# Patient Record
Sex: MALE | Race: WHITE | Employment: FULL TIME | URBAN - METROPOLITAN AREA
[De-identification: names, ages, dates, MRNs, and addresses within clinical notes are randomized per-mention and may not be internally consistent; named-entity substitution may affect disease eponyms.]

---

## 2020-06-25 ENCOUNTER — TELEPHONE (OUTPATIENT)
Dept: PRIMARY CARE CLINIC | Age: 30
End: 2020-06-25

## 2021-01-21 ENCOUNTER — OFFICE VISIT (OUTPATIENT)
Dept: ORTHOPEDIC SURGERY | Age: 31
End: 2021-01-21
Payer: COMMERCIAL

## 2021-01-21 VITALS — HEIGHT: 67 IN | WEIGHT: 135 LBS | BODY MASS INDEX: 21.19 KG/M2

## 2021-01-21 DIAGNOSIS — L30.9 DERMATITIS: ICD-10-CM

## 2021-01-21 DIAGNOSIS — M79.671 RIGHT FOOT PAIN: Primary | ICD-10-CM

## 2021-01-21 DIAGNOSIS — M79.672 LEFT FOOT PAIN: ICD-10-CM

## 2021-01-21 DIAGNOSIS — Z76.89 ENCOUNTER TO ESTABLISH CARE WITH NEW DOCTOR: ICD-10-CM

## 2021-01-21 DIAGNOSIS — B35.3 TINEA PEDIS OF BOTH FEET: ICD-10-CM

## 2021-01-21 PROCEDURE — 99203 OFFICE O/P NEW LOW 30 MIN: CPT | Performed by: ORTHOPAEDIC SURGERY

## 2021-01-21 NOTE — PROGRESS NOTES
[facility]  Luke Pro MD    Patient Name: Iraj Pace MRN: <[id number]>   Age: 31 y.o. YOB: 1990   Sex: male      [address] Complaint   Patient presents with    New Patient     Bilateral Foot - phalanxes w/abrasions that result in discoloration and itiching. Started 3wks ago        [facility]   Iraj Pace is a 31 y.o. male presents the office today for evaluation of a problem with his toes. About 3 weeks ago he noticed some discoloration in the toes of his left greater than right foot. He had had no injury and this is not a painful condition. He is noted some purplish discoloration and some small blisterlike formations and occasionally some itching. He has had no previous evaluation or treatment for this problem. He did some research on the Internet and became concerned enough to come in for orthopedic consultation today. He states he has recently moved to the [facility] and does not have an established primary care physician I was not sure where to go for help 1st.    PAST MEDICAL HISTORY    History reviewed. No pertinent past medical history. PAST SURGICAL HISTORY   History reviewed. No pertinent surgical history. MEDICATIONS     No current outpatient medications on file. No current facility-administered medications for this visit. ALLERGIES   Not on File    FAMILY HISTORY   History reviewed. No pertinent family history.     SOCIAL HISTORY     Social History     Socioeconomic History    Marital status: Single     Spouse name: None    Number of children: None    Years of education: None    Highest education level: None   Occupational History    None   Social Needs    Financial resource strain: None    Food insecurity     Worry: None     Inability: None    Transportation needs     Medical: None     Non-medical: None   Tobacco Use    Smoking status: None   Substance and Sexual Activity    Alcohol use: None    Drug use: None    Sexual activity: None   Lifestyle    Physical activity     Days per week: None     Minutes per session: None    Stress: None   Relationships    Social connections     Talks on phone: None     Gets together: None     Attends Alevism service: None     Active member of club or organization: None     Attends meetings of clubs or organizations: None     Relationship status: None    Intimate partner violence     Fear of current or ex partner: None     Emotionally abused: None     Physically abused: None     Forced sexual activity: None   Other Topics Concern    None   Social History Narrative    None       REVIEW OF SYSTEMS   General: no fever, chills, night sweats, anorexia, malaise, fatigue, or weight change  Hematologic:  no unexplained bleeding or bruising  HEENT:   no nasal congestion, rhinorrhea, sore throat, or facial pain  Respiratory:  no cough, dyspnea, or chest pain  Cardiovascular:  no angina, US, PND, orthopnea, dependent edema, or palpitations  Gastrointestinal:  no nausea, vomiting, diarrhea, constipation, or abdominal pain  Genitourinary:  no urinary urgency, frequency, dysuria, or hematuria  Musculoskeletal: see HPI  Endocrine:  no heat or cold intolerance and no polyphagia, polydipsia, or polyuria  Skin:  no skin eruptions or changing lesions  Neurologic:  no focal weakness, numbness/tingling, tremor, or severe headache. See HPI. See HPI for pertinent positives. PHYSICAL EXAM   Vital Signs:   Vitals:    01/21/21 1326   Weight: 135 lb (61.2 kg)   Height: 5' 7\" (1.702 m)       General appearance: healthy, alert, no distress  Skin: Skin color, texture, turgor normal. No rashes or lesions  HEENT: atraumatic, normocephalic.  PERRL  Respiratory: Unlabored breathing  Lymphatic: No adenopathy   Neuro: Alert and oriented, normal distal sensation, normal bilateral DTRs  Vascular: Normal distal capillary and distal pulses  Muskuloskeletal Exam: Bilateral foot examination shows some purplish discoloration in the left foot 2nd 3rd and 4th toes and in the right foot 2nd toe without erythema or warmth. There is no ecchymosis. There is no tenderness. There is a small area of dried skin on the dorsal aspects of the toes. No purpura or petechial hemorrhages. No nail abnormalities. No other callosities or skin lesions. Normal alignment of the lesser toes. Normal gait. RADIOLOGY   no radiographs taken today    IMPRESSION     1. Right foot pain    2. Left foot pain    3. Dermatitis    4. Tinea pedis of both feet    5. Encounter to establish care with new doctor         PLAN   I had a lengthy discussion with patient today regarding diagnosis and treatment options and recommendations. I suspect some sort of dermatitis or skin infection such as tinea pedis. I am going to suggest some topical antifungal medication that he can obtain over-the-counter. We discussed proper skin care for the feet I recommended wicking socks and frequent changes in shoe wear. I am going to refer him to a dermatologist but more importantly I think it would be best for him to establish care with a primary care physician as a first-line resource for medical issues that arise as well as for well patient care. FOLLOWUP     Return if symptoms worsen or fail to improve.     Orders Placed This Encounter   Procedures   [facility]     Referral Priority:   Routine     Referral Type:   Eval and Treat     Referral Reason:   Specialty Services Required     Requested Specialty:   Dermatology     Number of Visits Requested:   1   Cely Cantu MD, Family Medicine, [facility]     Referral Priority:   Routine     Referral Type:   Eval and Treat     Referral Reason:   Specialty Services Required     Referred to Provider:   Hossein Villar MD     Requested Specialty:   Family Medicine     Number of Visits Requested:   1      No orders of the defined types were placed in this encounter.       Patient was instructed on appropriate use of braces, participation in home exercise programs, healthy lifestyle choices and weight loss as appropriate     Primo Rosado MD

## 2021-08-24 ENCOUNTER — APPOINTMENT (RX ONLY)
Dept: URBAN - METROPOLITAN AREA CLINIC 170 | Facility: CLINIC | Age: 31
Setting detail: DERMATOLOGY
End: 2021-08-24

## 2021-08-24 DIAGNOSIS — L23.9 ALLERGIC CONTACT DERMATITIS, UNSPECIFIED CAUSE: ICD-10-CM | Status: INADEQUATELY CONTROLLED

## 2021-08-24 DIAGNOSIS — L259 CONTACT DERMATITIS AND OTHER ECZEMA, UNSPECIFIED CAUSE: ICD-10-CM

## 2021-08-24 PROBLEM — L30.9 DERMATITIS, UNSPECIFIED: Status: ACTIVE | Noted: 2021-08-24

## 2021-08-24 PROCEDURE — 99204 OFFICE O/P NEW MOD 45 MIN: CPT | Mod: 25

## 2021-08-24 PROCEDURE — ? COUNSELING

## 2021-08-24 PROCEDURE — ? PRESCRIPTION

## 2021-08-24 PROCEDURE — 11104 PUNCH BX SKIN SINGLE LESION: CPT

## 2021-08-24 PROCEDURE — ? BIOPSY BY PUNCH METHOD

## 2021-08-24 PROCEDURE — ? ADDITIONAL NOTES

## 2021-08-24 RX ORDER — TRIAMCINOLONE ACETONIDE 1 MG/G
CREAM TOPICAL
Qty: 1 | Refills: 2 | Status: CANCELLED
Stop reason: SDUPTHER

## 2021-08-24 RX ORDER — TRIAMCINOLONE ACETONIDE 1 MG/G
CREAM TOPICAL
Qty: 1 | Refills: 5 | Status: ERX | COMMUNITY
Start: 2021-08-24

## 2021-08-24 RX ORDER — PREDNISONE 20 MG/1
TABLET ORAL
Qty: 30 | Refills: 0 | Status: ERX | COMMUNITY
Start: 2021-08-24

## 2021-08-24 RX ORDER — PREDNISONE 20 MG/1
TABLET ORAL
Qty: 30 | Refills: 0 | Status: CANCELLED
Stop reason: SDUPTHER

## 2021-08-24 RX ADMIN — PREDNISONE: 20 TABLET ORAL at 00:00

## 2021-08-24 RX ADMIN — TRIAMCINOLONE ACETONIDE: 1 CREAM TOPICAL at 00:00

## 2021-08-24 ASSESSMENT — LOCATION ZONE DERM: LOCATION ZONE: LEG

## 2021-08-24 ASSESSMENT — LOCATION SIMPLE DESCRIPTION DERM: LOCATION SIMPLE: LEFT THIGH

## 2021-08-24 ASSESSMENT — LOCATION DETAILED DESCRIPTION DERM: LOCATION DETAILED: LEFT ANTERIOR LATERAL PROXIMAL THIGH

## 2021-08-24 NOTE — PROCEDURE: BIOPSY BY PUNCH METHOD
Detail Level: Detailed
Was A Bandage Applied: Yes
Punch Size In Mm: 4
Biopsy Type: H and E
Anesthesia Type: 2% lidocaine with epinephrine
Anesthesia Volume In Cc (Will Not Render If 0): 1
Additional Anesthesia Volume In Cc (Will Not Render If 0): 0
Hemostasis: Aluminum Chloride and Electrocautery
Epidermal Sutures: 4-0 Ethilon
Wound Care: Bacitracin
Dressing: bandage
Suture Removal: 14 days
Patient Will Remove Sutures At Home?: No
Lab: -102
Lab Facility: 3
Consent: Written consent was obtained and risks were reviewed including but not limited to scarring, infection, bleeding, scabbing, incomplete removal, nerve damage and allergy to anesthesia.
Post-Care Instructions: I reviewed with the patient in detail post-care instructions. Patient is to keep the biopsy site dry overnight, and then apply bacitracin twice daily until healed. Patient may apply hydrogen peroxide soaks to remove any crusting.
Home Suture Removal Text: Patient was provided a home suture removal kit and will remove their sutures at home.  If they have any questions or difficulties they will call the office.
Notification Instructions: Patient will be notified of biopsy results. However, patient instructed to call the office if not contacted within 2 weeks.
Billing Type: Third-Party Bill
Information: Selecting Yes will display possible errors in your note based on the variables you have selected. This validation is only offered as a suggestion for you. PLEASE NOTE THAT THE VALIDATION TEXT WILL BE REMOVED WHEN YOU FINALIZE YOUR NOTE. IF YOU WANT TO FAX A PRELIMINARY NOTE YOU WILL NEED TO TOGGLE THIS TO 'NO' IF YOU DO NOT WANT IT IN YOUR FAXED NOTE.

## 2021-08-24 NOTE — HPI: RASH
What Type Of Note Output Would You Prefer (Optional)?: Bullet Format
How Severe Is Your Rash?: moderate
Is This A New Presentation, Or A Follow-Up?: Rash
Additional History: Rash started around August 11, went to [facility] 2 days later, first treatment Benadryl prescribed, by Sunday face swelling and rash all over, went back to clinic, was given a shot and prednisone pack, finished pack on Saturday.

## 2021-09-07 ENCOUNTER — APPOINTMENT (RX ONLY)
Dept: URBAN - METROPOLITAN AREA CLINIC 170 | Facility: CLINIC | Age: 31
Setting detail: DERMATOLOGY
End: 2021-09-07

## 2021-09-07 DIAGNOSIS — Z48.02 ENCOUNTER FOR REMOVAL OF SUTURES: ICD-10-CM

## 2021-09-07 PROCEDURE — ? SUTURE REMOVAL (GLOBAL PERIOD)

## 2021-09-07 ASSESSMENT — LOCATION SIMPLE DESCRIPTION DERM: LOCATION SIMPLE: LEFT THIGH

## 2021-09-07 ASSESSMENT — LOCATION ZONE DERM: LOCATION ZONE: LEG

## 2021-09-07 ASSESSMENT — LOCATION DETAILED DESCRIPTION DERM: LOCATION DETAILED: LEFT ANTERIOR LATERAL PROXIMAL THIGH

## 2021-09-07 NOTE — PROCEDURE: SUTURE REMOVAL (GLOBAL PERIOD)
Detail Level: Detailed
Add 79762 Cpt? (Important Note: In 2017 The Use Of 37811 Is Being Tracked By Cms To Determine Future Global Period Reimbursement For Global Periods): no

## 2022-06-06 ENCOUNTER — TELEPHONE (OUTPATIENT)
Dept: FAMILY MEDICINE CLINIC | Age: 32
End: 2022-06-06

## 2022-06-06 NOTE — TELEPHONE ENCOUNTER
----- Message from Yandy Braga sent at 6/6/2022 10:06 AM EDT -----  Subject: Message to Provider    QUESTIONS  Information for Provider? pt has est care appt on 6/28/2022  Would like   to called if anything sooner opens up   ---------------------------------------------------------------------------  --------------  [address]  What is the best way for the office to contact you? OK to leave message on   voicemail  Preferred Call Back Phone Number? [phone]  ---------------------------------------------------------------------------  --------------  SCRIPT ANSWERS  Relationship to Patient?  Self

## 2022-06-06 NOTE — TELEPHONE ENCOUNTER
----- Message from Kayley Anne sent at 6/6/2022 10:06 AM EDT -----  Subject: Message to Provider    QUESTIONS  Information for Provider? pt has est care appt on 6/28/2022  Would like   to called if anything sooner opens up   ---------------------------------------------------------------------------  --------------  [address]  What is the best way for the office to contact you? OK to leave message on   voicemail  Preferred Call Back Phone Number? [phone]  ---------------------------------------------------------------------------  --------------  SCRIPT ANSWERS  Relationship to Patient?  Self

## 2022-06-28 ENCOUNTER — OFFICE VISIT (OUTPATIENT)
Dept: FAMILY MEDICINE CLINIC | Age: 32
End: 2022-06-28
Payer: COMMERCIAL

## 2022-06-28 VITALS
HEART RATE: 73 BPM | OXYGEN SATURATION: 99 % | DIASTOLIC BLOOD PRESSURE: 80 MMHG | HEIGHT: 67 IN | WEIGHT: 134 LBS | SYSTOLIC BLOOD PRESSURE: 118 MMHG | TEMPERATURE: 97.3 F | BODY MASS INDEX: 21.03 KG/M2

## 2022-06-28 DIAGNOSIS — M50.30 DDD (DEGENERATIVE DISC DISEASE), CERVICAL: ICD-10-CM

## 2022-06-28 DIAGNOSIS — F41.9 ANXIETY: ICD-10-CM

## 2022-06-28 DIAGNOSIS — Z76.89 ENCOUNTER TO ESTABLISH CARE WITH NEW DOCTOR: ICD-10-CM

## 2022-06-28 DIAGNOSIS — F41.9 ANXIETY: Primary | ICD-10-CM

## 2022-06-28 PROBLEM — E80.4 GILBERT'S SYNDROME: Status: ACTIVE | Noted: 2019-05-06

## 2022-06-28 LAB
A/G RATIO: 2 (ref 1.1–2.2)
ALBUMIN SERPL-MCNC: 5 G/DL (ref 3.4–5)
ALP BLD-CCNC: 43 U/L (ref 40–129)
ALT SERPL-CCNC: 14 U/L (ref 10–40)
ANION GAP SERPL CALCULATED.3IONS-SCNC: 14 MMOL/L (ref 3–16)
AST SERPL-CCNC: 23 U/L (ref 15–37)
BASOPHILS ABSOLUTE: 0 K/UL (ref 0–0.2)
BASOPHILS RELATIVE PERCENT: 0.3 %
BILIRUB SERPL-MCNC: 0.9 MG/DL (ref 0–1)
BUN BLDV-MCNC: 11 MG/DL (ref 7–20)
CALCIUM SERPL-MCNC: 10.3 MG/DL (ref 8.3–10.6)
CHLORIDE BLD-SCNC: 100 MMOL/L (ref 99–110)
CO2: 25 MMOL/L (ref 21–32)
CREAT SERPL-MCNC: 0.9 MG/DL (ref 0.9–1.3)
EOSINOPHILS ABSOLUTE: 0 K/UL (ref 0–0.6)
EOSINOPHILS RELATIVE PERCENT: 0.7 %
GFR AFRICAN AMERICAN: >60
GFR NON-AFRICAN AMERICAN: >60
GLUCOSE BLD-MCNC: 85 MG/DL (ref 70–99)
HCT VFR BLD CALC: 46.3 % (ref 40.5–52.5)
HEMOGLOBIN: 15.9 G/DL (ref 13.5–17.5)
LYMPHOCYTES ABSOLUTE: 1.8 K/UL (ref 1–5.1)
LYMPHOCYTES RELATIVE PERCENT: 37.8 %
MCH RBC QN AUTO: 33 PG (ref 26–34)
MCHC RBC AUTO-ENTMCNC: 34.2 G/DL (ref 31–36)
MCV RBC AUTO: 96.3 FL (ref 80–100)
MONOCYTES ABSOLUTE: 0.5 K/UL (ref 0–1.3)
MONOCYTES RELATIVE PERCENT: 11 %
NEUTROPHILS ABSOLUTE: 2.4 K/UL (ref 1.7–7.7)
NEUTROPHILS RELATIVE PERCENT: 50.2 %
PDW BLD-RTO: 12.9 % (ref 12.4–15.4)
PLATELET # BLD: 227 K/UL (ref 135–450)
PMV BLD AUTO: 8.7 FL (ref 5–10.5)
POTASSIUM SERPL-SCNC: 4.3 MMOL/L (ref 3.5–5.1)
RBC # BLD: 4.81 M/UL (ref 4.2–5.9)
SODIUM BLD-SCNC: 139 MMOL/L (ref 136–145)
TOTAL PROTEIN: 7.5 G/DL (ref 6.4–8.2)
TSH REFLEX FT4: 2.52 UIU/ML (ref 0.27–4.2)
WBC # BLD: 4.8 K/UL (ref 4–11)

## 2022-06-28 PROCEDURE — 99203 OFFICE O/P NEW LOW 30 MIN: CPT | Performed by: NURSE PRACTITIONER

## 2022-06-28 RX ORDER — BUSPIRONE HYDROCHLORIDE 5 MG/1
5 TABLET ORAL 3 TIMES DAILY
Qty: 90 TABLET | Refills: 0 | Status: SHIPPED | OUTPATIENT
Start: 2022-06-28 | End: 2022-07-28

## 2022-06-28 ASSESSMENT — PATIENT HEALTH QUESTIONNAIRE - PHQ9
1. LITTLE INTEREST OR PLEASURE IN DOING THINGS: 1
SUM OF ALL RESPONSES TO PHQ QUESTIONS 1-9: 2
SUM OF ALL RESPONSES TO PHQ QUESTIONS 1-9: 2
SUM OF ALL RESPONSES TO PHQ9 QUESTIONS 1 & 2: 2
2. FEELING DOWN, DEPRESSED OR HOPELESS: 1
SUM OF ALL RESPONSES TO PHQ QUESTIONS 1-9: 2
SUM OF ALL RESPONSES TO PHQ QUESTIONS 1-9: 2

## 2022-06-28 NOTE — PROGRESS NOTES
2022    Jayna Pollock (:  1990) is a 36 y.o. male, here to establish care or for evaluation of the following medical concerns:    Resents to establish care:  C/o anxiety- constant overthinking. All started in November but can't recall a trigger. Thoughts of dying are constant. Has developed techniques to check heart beat and to reassure himself he is OK  Fear of breathing problems and vomiting  Worried about heart failure  Is usually able to talk self out of it but is interfering with his daily activities now- can't sit in movies or plays, thoughts overtake his ability to function  Started drinking wine to calm self. 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 No SI/HI  Not , no partner now. Family in the area. Supportive of only what he has shared  Not currently SA- when he was female partners only  Loves to play pickleball for exercise but having problems convincing himself to play. Is amenable to therapy and meds      Review of Systems   Musculoskeletal: Positive for neck pain. Occ left scapular and shoulder pain, radiating to left arm. No current pain. +DDD C4-5   All other systems reviewed and are negative. Current Outpatient Medications   Medication Sig Dispense Refill    busPIRone (BUSPAR) 5 MG tablet Take 1 tablet by mouth 3 times daily 90 tablet 0     No current facility-administered medications for this visit. No Known Allergies    No past medical history on file. No past surgical history on file.     Social History     Socioeconomic History    Marital status: Single     Spouse name: Not on file    Number of children: Not on file    Years of education: Not on file    Highest education level: Not on file   Occupational History    Not on file   Tobacco Use    Smoking status: Current Every Day Smoker     Types: Cigars    Smokeless tobacco: Never Used   Substance and Sexual Activity    Alcohol use: Not Currently    Drug use: Never    Sexual activity: Not on file   Other Topics Concern    Not on file   Social History Narrative    Not on file     Social Determinants of Health     Financial Resource Strain:     Difficulty of Paying Living Expenses: Not on file   Food Insecurity:     Worried About Running Out of Food in the Last Year: Not on file    Claudia of Food in the Last Year: Not on file   Transportation Needs:     Lack of Transportation (Medical): Not on file    Lack of Transportation (Non-Medical): Not on file   Physical Activity:     Days of Exercise per Week: Not on file    Minutes of Exercise per Session: Not on file   Stress:     Feeling of Stress : Not on file   Social Connections:     Frequency of Communication with Friends and Family: Not on file    Frequency of Social Gatherings with Friends and Family: Not on file    Attends Zoroastrianism Services: Not on file    Active Member of [address] [place] [address] or Organizations: Not on file    Attends Club or Organization Meetings: Not on file    Marital Status: Not on file   Intimate Partner Violence:     Fear of Current or Ex-Partner: Not on file    Emotionally Abused: Not on file    Physically Abused: Not on file    Sexually Abused: Not on file   Housing Stability:     Unable to Pay for Housing in the Last Year: Not on file    Number of [place] in the Last Year: Not on file    Unstable Housing in the Last Year: Not on file        No family history on file. Vitals:    06/28/22 0956   BP: 118/80   Pulse: 73   Temp: 97.3 °F (36.3 °C)   SpO2: 99%       Estimated body mass index is 20.99 kg/m² as calculated from the following:    Height as of this encounter: 5' 7\" (1.702 m). Weight as of this encounter: 134 lb (60.8 kg). Physical Exam  Constitutional:       General: He is not in acute distress. Appearance: He is well-developed. He is not diaphoretic.    HENT: Head: Normocephalic and atraumatic. Right Ear: External ear normal.      Left Ear: External ear normal.      Nose: Nose normal.      Mouth/Throat:      Pharynx: No oropharyngeal exudate. Eyes:      General:         Right eye: No discharge. Left eye: No discharge. Conjunctiva/sclera: Conjunctivae normal.      Pupils: Pupils are equal, round, and reactive to light. Neck:      Thyroid: No thyromegaly. Trachea: No tracheal deviation. Cardiovascular:      Rate and Rhythm: Normal rate and regular rhythm. Heart sounds: Normal heart sounds. No murmur heard. No friction rub. No gallop. Pulmonary:      Effort: Pulmonary effort is normal. No respiratory distress. Breath sounds: Normal breath sounds. No stridor. No wheezing or rales. Chest:      Chest wall: No tenderness. Abdominal:      General: Bowel sounds are normal. There is no distension. Palpations: Abdomen is soft. There is no mass. Tenderness: There is no abdominal tenderness. There is no guarding or rebound. Hernia: No hernia is present. Musculoskeletal:         General: No tenderness or deformity. Normal range of motion. Cervical back: Normal range of motion and neck supple. Lymphadenopathy:      Cervical: No cervical adenopathy. Skin:     General: Skin is warm and dry. Capillary Refill: Capillary refill takes less than 2 seconds. Coloration: Skin is not pale. Findings: No erythema or rash. Neurological:      Mental Status: He is alert and oriented to person, place, and time. Cranial Nerves: No cranial nerve deficit. Sensory: No sensory deficit. Motor: No abnormal muscle tone. Coordination: Coordination normal.   Psychiatric:         Behavior: Behavior normal.         Thought Content:  Thought content normal.         Judgment: Judgment normal.         ASSESSMENT/PLAN:  Health Maintenance   Topic Date Due    Pneumococcal 0-64 years Vaccine (1 - PCV) Never done    Depression Screen  06/28/2023    DTaP/Tdap/Td vaccine (3 - Td or Tdap) 05/16/2026    Hepatitis A vaccine  Completed    Meningococcal (ACWY) vaccine  Completed    Flu vaccine  Completed    COVID-19 Vaccine  Completed    Hepatitis B vaccine  Aged Out    Hib vaccine  Aged Out    Varicella vaccine  Discontinued    Hepatitis C screen  Discontinued    HIV screen  Discontinued        Diagnosis Orders   1. Anxiety  busPIRone (BUSPAR) 5 MG tablet    CBC with Auto Differential    Comprehensive Metabolic Panel    TSH with Reflex to FT4   2. Encounter to establish care with new doctor     3. DDD (degenerative disc disease), cervical     Anxiety  TANI 21  Not controlled  Buspar TID  Start in evening to check effects  Follow up with Dr. Abena Baldwin for eval and poss CBT  See me in 2 weeks    Encounter to establish care with new doctor   HM UTD    DDD (degenerative disc disease), cervical  Stable, well controlled          Return in about 2 weeks (around 7/12/2022). An  electronic signature was used to authenticate this note.   --Elisabeth Beltran, JOSE ANTONIO - CNP on 6/29/2022 at 11:07 AM

## 2022-06-28 NOTE — ASSESSMENT & PLAN NOTE
TANI 21  Not controlled  Buspar TID  Start in evening to check effects  Follow up with Dr. Berton Goltz for eval and poss CBT  See me in 2 weeks

## 2022-06-29 ASSESSMENT — ANXIETY QUESTIONNAIRES
7. FEELING AFRAID AS IF SOMETHING AWFUL MIGHT HAPPEN: 3
1. FEELING NERVOUS, ANXIOUS, OR ON EDGE: 3
GAD7 TOTAL SCORE: 21
2. NOT BEING ABLE TO STOP OR CONTROL WORRYING: 3
3. WORRYING TOO MUCH ABOUT DIFFERENT THINGS: 3
IF YOU CHECKED OFF ANY PROBLEMS ON THIS QUESTIONNAIRE, HOW DIFFICULT HAVE THESE PROBLEMS MADE IT FOR YOU TO DO YOUR WORK, TAKE CARE OF THINGS AT HOME, OR GET ALONG WITH OTHER PEOPLE: EXTREMELY DIFFICULT
4. TROUBLE RELAXING: 3
5. BEING SO RESTLESS THAT IT IS HARD TO SIT STILL: 3
6. BECOMING EASILY ANNOYED OR IRRITABLE: 3

## 2022-07-25 ENCOUNTER — TELEMEDICINE (OUTPATIENT)
Dept: PSYCHOLOGY | Age: 32
End: 2022-07-25
Payer: COMMERCIAL

## 2022-07-25 DIAGNOSIS — F41.1 GENERALIZED ANXIETY DISORDER: Primary | ICD-10-CM

## 2022-07-25 PROCEDURE — 90791 PSYCH DIAGNOSTIC EVALUATION: CPT | Performed by: PSYCHOLOGIST

## 2022-07-25 NOTE — Clinical Note
Thanks for the referral. Going to need some more time with him. He's currently quite closed off to his internal experience. Will work on building more self-awareness and skill building. He hasn't tried the BuSpar yet because when he asked the pharmacist about drinking alcohol on it, the pharmacist told  him it was a very low dose and it was fine, which pt took to mean it wouldn't do much for him anyway. But when I mentioned that it could still help, he shifted back to saying he was glad he has it and is open to trying it, he just hasn't yet. Probably best to hold off on introducing the idea of an SSRI for now until we see how much progress we can make. Thanks!

## 2022-07-25 NOTE — PROGRESS NOTES
Behavioral Health Consultation  Renate Orourke Psy.D. Psychologist  7/25/2022  9-9:40 AM EDT      Time spent with Patient: 40 minutes  This is patient's first [facility] appointment. Reason for Consult: Anxiety  Referring Provider: JOSE ANTONIO Soares - CNP  [address] [address]    TELEHEALTH VISIT -- Audio/Visual (During [facility] public health emergency)    Pt provided informed consent for the behavioral health program and participation in telehealth services. Discussed with patient the model of service, including the limits of confidentiality (e.g., abuse reporting, suicide intervention) and the nature of the [facility] approach (e.g., focused, targeted interventions; open communication with PCP). Pt indicated understanding. Feedback given to PCP. Edgard Oquendo was evaluated through a synchronous (real-time) audio-video encounter using HIPAA-compliant technology. The patient (or guardian, if applicable) is aware that this is a billable service, which includes applicable co-pays. This Virtual Visit was conducted with patient's (and/or legal guardian's) consent. The visit was conducted pursuant to the emergency declaration under the [address] [place] [address] authority and the [employer] General Act. Patient identification was verified, and a caregiver was present when appropriate. The patient was located in a state where the provider was licensed to provide care. Conducted a risk-benefit analysis and determined that the patient's presenting problems are consistent with the use of telepsychology. Determined that the patient has sufficient knowledge and skills in the use of technology enabling them to adequately benefit from telepsychology. It was determined that this patient was able to be properly treated without an in-person session. Patient verified current location at the beginning of the visit.     Verified the following information:  Patient's identification: Yes  Patient location: [place] [id number]  Patient's call back number: [phone]  Patient's emergency contact's name and number, as well as permission to contact them if needed:  Extended Emergency Contact Information  Primary Emergency Contact: [address] Phone: [phone]  Relation: Parent  Secondary Emergency Contact: NATACHA/ Denisa [address], [address] Phone: [phone]  Relation: Parent    Provider location: Jon Mccall:    Love-Work-Play     -Living Situation - Lives alone. Bought a house in March 2020 in [place].     -Family / [place] - Family lives in [place]. Good friend group that he spends a lot of time with.    -Work / School - [employer]. Travels often. Every week is different. 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 -Anjum Juárezel / [employer] Live / Stress Management - Likes to play sports. Hangs out with friends - concerts, plays, sporting events. Walks his dog. Foodie. Not feeling stressed. Goes until he hits a wall, then he stops. -Confucianism / Spiritual Life - Goes to [employer]. Health Behaviors     -Alcohol / Drugs - Averages 2 glasses, 3-4 times per week. Cut back on alcohol but still drinks with work. Tripled water intake. -Caffeine - 1 cup of coffee daily. Cut back on pop; now have 1 pop per week. -Sleep - Averaging 6-8 hours/night. Able to sleep through the night. Able to fall sleep. Not consistent with sleep schedule. -Exercise - Plays sports. Had taken time off from physical activity d/t a shoulder issue that led to concern about heart issues.     -Risk Assessment - No SI plan or intent. -Mental Health - Hard to slow his mind down. Has \"ludicrous\" thoughts that he knows aren't real. Fear is primarily focused on the health of his heart. Recalls leaving seminars in college for no reason.  Has to sit on the isle at [employer] events in case he vomits, which has never happened. Usually feels better with time, which shows him that the feared events do not happen. No past psychotherapy. PCP prescribed BuSpar. Pt chose not to take it yet, although he reported benefiting from knowing he has that option. Social History     Tobacco Use    Smoking status: Every Day     Types: Cigars    Smokeless tobacco: Never   Substance Use Topics    Alcohol use: Not Currently      Illicit drugs:   Social History     Substance and Sexual Activity   Drug Use Never        O:  Interventions:  -Contextual assessment  -Supportive techniques  -Provided psychoeducation re: anxiety and tuning in to his internal experience rather than tuning out and avoiding / distracting  -Practiced diaphragmatic breathing  -Recommended journaling  -Conducted risk assessment. Appropriate for outpatient / telehealth care at this time. A:  MSE:  Appearance: good hygiene  and appropriate attire  Attitude: cooperative and friendly  Consciousness: alert  Orientation: oriented to person, place, time, general circumstance  Memory: recent and remote memory intact  Attention/Concentration: intact during session  Psychomotor Activity: normal  Eye Contact: normal  Speech: normal rate and volume, well-articulated  Mood: anxious  Affect: congruent  Perception: within normal limits  Thought Content: within normal limits  Thought Process: logical, coherent and goal-directed  Insight: good  Judgment: intact  Ability to understand instructions: Yes  Ability to respond meaningfully: Yes  Morbid Ideation: no   Suicide Assessment: no suicidal ideation, plan, or intent. Appropriate for outpatient / telehealth care at this time. Homicidal Ideation: no      TANI 7 SCORE 6/29/2022   TANI-7 Total Score 21     Interpretation of TANI-7 score: 5-9 = mild anxiety, 10-14 = moderate anxiety, 15+ = severe anxiety. Recommend referral to behavioral health for scores 10 or greater.     PHQ Scores 6/28/2022 PHQ2 Score 2   PHQ9 Score 2     Interpretation of Total Score Depression Severity: 1-4 = Minimal depression, 5-9 = Mild depression, 10-14 = Moderate depression, 15-19 = Moderately severe depression, 20-27 = Severe depression    Diagnosis:    1. Generalized anxiety disorder        Patient Active Problem List   Diagnosis    Gilbert's syndrome    DDD (degenerative disc disease), cervical    Anxiety    Encounter to establish care with new doctor         Plan:  Pt interventions:  Established rapport, Avant-setting to identify pt's primary goals for [facility] visit / overall health, Supportive techniques, Provided Psychoeducation re: see above, Emphasized self-care as important for managing overall health, Discussed psychotropic medications, and Completed risk evaluation. Pt Behavioral Change Plan:  Pt set the following goals:  Practice diaphragmatic breathing throughout the day  Consider journaling in writing or using an river like Day One    Pt scheduled a F/U virtual visit.

## 2022-07-25 NOTE — PATIENT INSTRUCTIONS
Goals:  Practice diaphragmatic breathing throughout the day  Consider journaling in writing or using an river like Day One

## 2022-09-12 ENCOUNTER — OFFICE VISIT (OUTPATIENT)
Dept: FAMILY MEDICINE CLINIC | Age: 32
End: 2022-09-12
Payer: COMMERCIAL

## 2022-09-12 VITALS
BODY MASS INDEX: 20.36 KG/M2 | SYSTOLIC BLOOD PRESSURE: 102 MMHG | OXYGEN SATURATION: 99 % | DIASTOLIC BLOOD PRESSURE: 74 MMHG | HEART RATE: 82 BPM | WEIGHT: 130 LBS | TEMPERATURE: 97.3 F

## 2022-09-12 DIAGNOSIS — F41.9 ANXIETY: ICD-10-CM

## 2022-09-12 PROCEDURE — 99213 OFFICE O/P EST LOW 20 MIN: CPT | Performed by: NURSE PRACTITIONER

## 2022-09-12 RX ORDER — BUSPIRONE HYDROCHLORIDE 5 MG/1
5 TABLET ORAL 3 TIMES DAILY
COMMUNITY
End: 2022-09-21

## 2022-09-12 RX ORDER — ESCITALOPRAM OXALATE 10 MG/1
10 TABLET ORAL DAILY
Qty: 30 TABLET | Refills: 5 | Status: SHIPPED | OUTPATIENT
Start: 2022-09-12

## 2022-09-12 NOTE — PROGRESS NOTES
Aidan Yo (:  1990) is a 36 y.o. male,Established patient, here for evaluation of the following chief complaint(s):  Medication Adjustment      ASSESSMENT/PLAN:  1. Anxiety  Assessment & Plan:  TANI remains 21  Just started Buspar  Will add SSRI- he may choose to start this- let me know when so I can watch for aurelia. Hesitant to start stimulant- may be too stimulating- will need psych eval first.  To call insurance for provider and I will refer. Avoid ETOH    Return in about 4 weeks (around 10/10/2022). SUBJECTIVE/OBJECTIVE:  Pt endorses increasing anxiety  Declined a second visit with Aster Navas as she wouldn't meet virtually while he was driving  Just started Buspar 2 days ago after having a \"bad weekend\" of paralyzing panic attacks  Lists multiple symptoms of anxiety and panic- exhaustion at end of day  Thinks may have ADD- trouble focusing when in stimulating environment or when in silence. Current Outpatient Medications   Medication Sig Dispense Refill    busPIRone (BUSPAR) 5 MG tablet Take 5 mg by mouth 3 times daily      escitalopram (LEXAPRO) 10 MG tablet Take 1 tablet by mouth daily 30 tablet 5     No current facility-administered medications for this visit. Review of Systems   All other systems reviewed and are negative. Vitals:    22 1514   BP: 102/74   Site: Left Upper Arm   Position: Sitting   Cuff Size: Medium Adult   Pulse: 82   Temp: 97.3 °F (36.3 °C)   SpO2: 99%   Weight: 130 lb (59 kg)       Physical Exam  Constitutional:       Appearance: Normal appearance. He is well-developed and normal weight. HENT:      Head: Normocephalic. Right Ear: Tympanic membrane normal.      Left Ear: Tympanic membrane normal.      Mouth/Throat:      Mouth: Mucous membranes are moist.   Eyes:      Pupils: Pupils are equal, round, and reactive to light. Cardiovascular:      Rate and Rhythm: Normal rate.    Pulmonary:      Effort: Pulmonary effort is normal.   Musculoskeletal: General: Normal range of motion. Cervical back: Normal range of motion. Skin:     General: Skin is warm and dry. Neurological:      Mental Status: He is alert and oriented to person, place, and time. Psychiatric:         Mood and Affect: Mood normal.         Behavior: Behavior normal.         Thought Content: Thought content normal.         Judgment: Judgment normal.               An electronic signature was used to authenticate this note.     --Garry Webb, JOSE ANTONIO - CNP

## 2022-09-20 DIAGNOSIS — F41.9 ANXIETY DISORDER, UNSPECIFIED: ICD-10-CM

## 2022-09-21 RX ORDER — BUSPIRONE HYDROCHLORIDE 5 MG/1
TABLET ORAL
Qty: 90 TABLET | Refills: 3 | Status: SHIPPED | OUTPATIENT
Start: 2022-09-21

## 2022-10-04 ENCOUNTER — TELEMEDICINE (OUTPATIENT)
Dept: PSYCHOLOGY | Age: 32
End: 2022-10-04
Payer: COMMERCIAL

## 2022-10-04 DIAGNOSIS — F41.1 GENERALIZED ANXIETY DISORDER: Primary | ICD-10-CM

## 2022-10-04 PROCEDURE — 90834 PSYTX W PT 45 MINUTES: CPT | Performed by: PSYCHOLOGIST

## 2022-10-04 NOTE — PATIENT INSTRUCTIONS
Goals:  Practice diaphragmatic breathing throughout the day  Consider journaling in writing or using an river like Day One  Consider learning more about polyvagal theory at www. [email or web address] com/for-curious-people  Consider listening to binaural beats for anxiety and stress management

## 2022-10-04 NOTE — PROGRESS NOTES
Behavioral Health Consultation  Shane Wright Psy.D. Psychologist  10/4/2022  9:30-10:12 AM EDT      Time spent with Patient: 42 minutes  This is patient's second [facility] appointment. Reason for Consult: Anxiety  Referring Provider: JOSE ANTONIO Bennett - CNP  [address] [address]    TELEHEALTH VISIT -- Audio/Visual (During [facility]-02 public health emergency)    Tulio lOvera was evaluated through a synchronous (real-time) audio-video encounter using HIPAA-compliant technology. The patient (or guardian, if applicable) is aware that this is a billable service, which includes applicable co-pays. This Virtual Visit was conducted with patient's (and/or legal guardian's) consent. The visit was conducted pursuant to the emergency declaration under the [address] [place] [address], [address] [place] [address] authority and the [employer] General Act. Patient identification was verified, and a caregiver was present when appropriate. The patient was located in a state where the provider was licensed to provide care. Conducted a risk-benefit analysis and determined that the patient's presenting problems are consistent with the use of telepsychology. Determined that the patient has sufficient knowledge and skills in the use of technology enabling them to adequately benefit from telepsychology. It was determined that this patient was able to be properly treated without an in-person session. Patient verified current location at the beginning of the visit.     Verified the following information:  Patient's identification: Yes  Patient location: [place] [address]  Patient's call back number: [phone]  Patient's emergency contact's name and number, as well as permission to contact them if needed:  Extended Emergency Contact Information  Primary Emergency Contact: [address] Phone: [phone]  Relation: Parent  Secondary Emergency Contact: NATACHA/ [address], [address] Phone: [phone]  Relation: Parent    Provider location: Елена Carter:  Pt has been up and down over the past couple months. Taking BuSpar 5mg TID since September without benefit. Feeling fatigued, which he attributes to fear about fainting and having breathing issues. More aware of his energy limit when it comes to socializing. Benefiting from relaxing, being outside in the sun. He has tried grounding. Plays volleyball and pickelball. Pt saw a psychiatrist last week. She said BuSpar is PRN, whereas pt had been told to expect the effects to build up over time. She increased BuSpar to 10mg BID and prescribed Zoloft instead of Lexapro, neither of which pt has taken. Pt is overthinking his options, unsure which direction he wants to head. O:  Interventions:  -Supportive techniques  -Processed experiences / stressors / concerns  -Reinforced efforts towards self-care  -Attempted parts work (blended)  -Provided psychoeducation re: polyvagal theory and binaural beats  -Discussed psychotropic medications. Encouraged pt to consider following the psychiatrist's recommendations. A:  MSE:  Appearance: good hygiene  and appropriate attire  Attitude: cooperative and friendly  Consciousness: alert  Orientation: oriented to person, place, time, general circumstance  Memory: recent and remote memory intact  Attention/Concentration: intact during session  Psychomotor Activity: normal  Eye Contact: normal  Speech: normal rate and volume, well-articulated  Mood: anxious  Affect: congruent  Perception: within normal limits  Thought Content: within normal limits  Thought Process: logical, coherent and goal-directed  Insight: good  Judgment: intact  Ability to understand instructions: Yes  Ability to respond meaningfully: Yes  Morbid Ideation: no   Suicide Assessment: no suicidal ideation, plan, or intent.  Appropriate for outpatient / telehealth care at this time.  Homicidal Ideation: no      TANI 7 SCORE 6/29/2022   TANI-7 Total Score 21     Interpretation of TANI-7 score: 5-9 = mild anxiety, 10-14 = moderate anxiety, 15+ = severe anxiety. Recommend referral to behavioral health for scores 10 or greater. PHQ Scores 6/28/2022   PHQ2 Score 2   PHQ9 Score 2     Interpretation of Total Score Depression Severity: 1-4 = Minimal depression, 5-9 = Mild depression, 10-14 = Moderate depression, 15-19 = Moderately severe depression, 20-27 = Severe depression    Diagnosis:    1. Generalized anxiety disorder        Patient Active Problem List   Diagnosis    Gilbert's syndrome    DDD (degenerative disc disease), cervical    Anxiety    Encounter to establish care with new doctor         Plan:  Pt interventions:  Supportive techniques, Provided Psychoeducation re: see above, Emphasized self-care as important for managing overall health, Cognitive strategies to target balanced thinking, and Discussed psychotropic medications. Pt Behavioral Change Plan:  Pt set the following goals:  Practice diaphragmatic breathing throughout the day  Consider journaling in writing or using an river like Day One  Consider learning more about polyvagal theory at www. [email or web address] com/for-curious-people  Consider listening to binaural beats for anxiety and stress management    Pt scheduled a F/U virtual visit.

## 2022-10-24 ENCOUNTER — TELEMEDICINE (OUTPATIENT)
Dept: PSYCHOLOGY | Age: 32
End: 2022-10-24
Payer: COMMERCIAL

## 2022-10-24 DIAGNOSIS — F41.1 GENERALIZED ANXIETY DISORDER: Primary | ICD-10-CM

## 2022-10-24 PROCEDURE — 90832 PSYTX W PT 30 MINUTES: CPT | Performed by: PSYCHOLOGIST

## 2022-10-24 NOTE — PROGRESS NOTES
Behavioral Health Consultation  Dina Flores Psy.D. Psychologist  10/24/2022  3:40-4:10 PM EDT      Time spent with Patient: 30 minutes  This is patient's third [facility] appointment. Reason for Consult: Anxiety  Referring Provider: Lyle Wang, JOSE ANTONIO - CNP  [address] [address]    TELEHEALTH VISIT -- Audio/Visual (During [facility]-87 public health emergency)    Kalyn Mclean was evaluated through a synchronous (real-time) audio-video encounter using HIPAA-compliant technology. The patient (or guardian, if applicable) is aware that this is a billable service, which includes applicable co-pays. This Virtual Visit was conducted with patient's (and/or legal guardian's) consent. The visit was conducted pursuant to the emergency declaration under the [address] [place] [address], [address] [place] [address] authority and the [employer] General Act. Patient identification was verified, and a caregiver was present when appropriate. The patient was located in a state where the provider was licensed to provide care. Conducted a risk-benefit analysis and determined that the patient's presenting problems are consistent with the use of telepsychology. Determined that the patient has sufficient knowledge and skills in the use of technology enabling them to adequately benefit from telepsychology. It was determined that this patient was able to be properly treated without an in-person session. Patient verified current location at the beginning of the visit.     Verified the following information:  Patient's identification: Yes  Patient location: [place] [address]  Patient's call back number: [phone]  Patient's emergency contact's name and number, as well as permission to contact them if needed:  Extended Emergency Contact Information  Primary Emergency Contact: [address] [facility] Phone: [phone]  Relation: Parent  Secondary Emergency Contact: NATACHA/ [address], [address] Phone: [phone]  Relation: Parent    Provider location: Brionna Jean Baptiste:  Pt has been feeling the same. Decided not to take antidepressant medication. He notices some benefit from buspirone TID but he continues to struggle with significant anxiety. Seeking \"freedom of mind\" by keeping active and generally avoiding the anxiety. He endorsed ongoing anhedonia and a desire to avoid social situations, which impacts him at work. He is frustrated that his efforts to address his anxiety haven't been more effective. O:  Interventions:  -Supportive techniques  -Processed experiences / stressors / concerns  -Validated pt's frustrations with the process of managing his anxiety  -Explored his discomfort with the care he has received thus far  -Recommended creating a consistent self-care routine that includes activities such as deep breathing, guided meditation, walking/other forms of exercise, and journaling. Encouraged pt to track his anxiety to help him identify patterns. A:  MSE:  Appearance: good hygiene  and appropriate attire  Attitude: cooperative and friendly  Consciousness: alert  Orientation: oriented to person, place, time, general circumstance  Memory: recent and remote memory intact  Attention/Concentration: intact during session  Psychomotor Activity: normal  Eye Contact: normal  Speech: normal rate and volume, well-articulated  Mood: anxious  Affect: congruent  Perception: within normal limits  Thought Content: within normal limits  Thought Process: logical, coherent and goal-directed  Insight: good  Judgment: intact  Ability to understand instructions: Yes  Ability to respond meaningfully: Yes  Morbid Ideation: no   Suicide Assessment: no suicidal ideation, plan, or intent. Appropriate for outpatient / telehealth care at this time.   Homicidal Ideation: no      TANI 7 SCORE 6/29/2022   TANI-7 Total Score 21     Interpretation of TANI-7 score: 5-9 = mild anxiety, 10-14 = moderate anxiety, 15+ = severe anxiety. Recommend referral to behavioral health for scores 10 or greater. PHQ Scores 6/28/2022   PHQ2 Score 2   PHQ9 Score 2     Interpretation of Total Score Depression Severity: 1-4 = Minimal depression, 5-9 = Mild depression, 10-14 = Moderate depression, 15-19 = Moderately severe depression, 20-27 = Severe depression    Diagnosis:    1. Generalized anxiety disorder          Patient Active Problem List   Diagnosis    Gilbert's syndrome    DDD (degenerative disc disease), cervical    Anxiety    Encounter to establish care with new doctor         Plan:  Pt interventions:  Supportive techniques, Provided Psychoeducation re: see above, Emphasized self-care as important for managing overall health, Cognitive strategies to target balanced thinking, and Discussed psychotropic medications. Pt Behavioral Change Plan:  Pt set the following goals:  Practice diaphragmatic breathing throughout the day  Consider journaling in writing or using an river like Day One  Consider learning more about polyvagal theory at www. rhythmofregulation. com/for-curious-people  Consider listening to binaural beats for anxiety and stress management    Pt scheduled a F/U virtual visit.

## 2022-11-14 ENCOUNTER — TELEPHONE (OUTPATIENT)
Dept: FAMILY MEDICINE CLINIC | Age: 32
End: 2022-11-14

## 2022-11-14 NOTE — TELEPHONE ENCOUNTER
Called and spoke to pt, relayed information to pt that we do Genesight testing in office and scheduled pt for office visit with Carol Peters on 10/17/2022.

## 2022-11-14 NOTE — TELEPHONE ENCOUNTER
----- Message from [address] sent at 11/14/2022 10:59 AM EST -----  Subject: Message to Provider    QUESTIONS  Information for Provider? Nita Han would like to know if you do Estefany[facility] Eloisa. Please call and advise. Thank you  ---------------------------------------------------------------------------  --------------  Lady ISAAC  [phone]; OK to leave message on voicemail  ---------------------------------------------------------------------------  --------------  SCRIPT ANSWERS  Relationship to Patient?  Self

## 2022-11-17 ENCOUNTER — OFFICE VISIT (OUTPATIENT)
Dept: FAMILY MEDICINE CLINIC | Age: 32
End: 2022-11-17
Payer: COMMERCIAL

## 2022-11-17 VITALS
TEMPERATURE: 97.3 F | DIASTOLIC BLOOD PRESSURE: 78 MMHG | HEART RATE: 87 BPM | BODY MASS INDEX: 20.2 KG/M2 | OXYGEN SATURATION: 99 % | SYSTOLIC BLOOD PRESSURE: 110 MMHG | WEIGHT: 129 LBS

## 2022-11-17 DIAGNOSIS — F41.9 ANXIETY: Primary | ICD-10-CM

## 2022-11-17 PROCEDURE — 99213 OFFICE O/P EST LOW 20 MIN: CPT | Performed by: NURSE PRACTITIONER

## 2022-11-17 RX ORDER — BUSPIRONE HYDROCHLORIDE 10 MG/1
5 TABLET ORAL 3 TIMES DAILY
Qty: 90 TABLET | Refills: 0 | Status: SHIPPED | OUTPATIENT
Start: 2022-11-17

## 2022-11-17 NOTE — PROGRESS NOTES
Micky Marte (:  1990) is a 29 y.o. male,Established patient, here for evaluation of the following chief complaint(s): Other (Gene sight testing)      ASSESSMENT/PLAN:  1. Anxiety disorder, unspecified  Assessment & Plan:  Still not well controlled. He does not want to increase his Lexapro yet. We will do the GeneSight testing today. He can increase BuSpar to 5 mg 3 times daily. He has been seeing Luca Jo on a regular scheduled basis. He is amenable to this plan of care. Orders:  -     busPIRone (BUSPAR) 10 MG tablet; Take 0.5 tablets by mouth 3 times daily, Disp-90 tablet, R-0Normal    Return in about 4 weeks (around 12/15/2022). SUBJECTIVE/OBJECTIVE:  Serena Soriano presents today for GeneSight testing. He feels he is not getting adequate control with Lexapro and BuSpar and feels like this may help with his care. He tried increasing his BuSpar to 10 mg twice daily however felt like that made him dizzy. Current Outpatient Medications   Medication Sig Dispense Refill    busPIRone (BUSPAR) 10 MG tablet Take 0.5 tablets by mouth 3 times daily 90 tablet 0    escitalopram (LEXAPRO) 10 MG tablet Take 1 tablet by mouth daily 30 tablet 5     No current facility-administered medications for this visit. Review of Systems   All other systems reviewed and are negative. Vitals:    22 1049   BP: 110/78   Site: Left Upper Arm   Position: Sitting   Cuff Size: Medium Adult   Pulse: 87   Temp: 97.3 °F (36.3 °C)   SpO2: 99%   Weight: 129 lb (58.5 kg)       Physical Exam            An electronic signature was used to authenticate this note.     --JOSE ANTONIO Fuller - CNP

## 2022-11-17 NOTE — ASSESSMENT & PLAN NOTE
Still not well controlled. He does not want to increase his Lexapro yet. We will do the [employer] testing today. He can increase BuSpar to 5 mg 3 times daily. He has been seeing Misha Coleman on a regular scheduled basis. He is amenable to this plan of care.

## 2022-11-21 ENCOUNTER — TELEMEDICINE (OUTPATIENT)
Dept: PSYCHOLOGY | Age: 32
End: 2022-11-21
Payer: COMMERCIAL

## 2022-11-21 DIAGNOSIS — F41.1 GENERALIZED ANXIETY DISORDER: Primary | ICD-10-CM

## 2022-11-21 PROCEDURE — 90832 PSYTX W PT 30 MINUTES: CPT | Performed by: PSYCHOLOGIST

## 2022-11-21 NOTE — PROGRESS NOTES
Behavioral Health Consultation  Lamar Cervantes Psy.D. Psychologist  11/21/2022  11:30 AM - 12 PM EDT      Time spent with Patient: 30 minutes  This is patient's fourth [facility] appointment. Reason for Consult: Anxiety  Referring Provider: Celeste Haley, APRN - CNP  [address] [place] [address] [address]    TELEHEALTH VISIT -- Audio/Visual (During [facility]- public health emergency)    Mallorie Green was evaluated through a synchronous (real-time) audio-video encounter using HIPAA-compliant technology. The patient (or guardian, if applicable) is aware that this is a billable service, which includes applicable co-pays. This Virtual Visit was conducted with patient's (and/or legal guardian's) consent. The visit was conducted pursuant to the emergency declaration under the [address] [place] [address], [address] [place] [address] authority and the [employer] and [employer] Act. Patient identification was verified, and a caregiver was present when appropriate. The patient was located in a state where the provider was licensed to provide care. Conducted a risk-benefit analysis and determined that the patient's presenting problems are consistent with the use of telepsychology. Determined that the patient has sufficient knowledge and skills in the use of technology enabling them to adequately benefit from telepsychology. It was determined that this patient was able to be properly treated without an in-person session. Patient verified current location at the beginning of the visit.     Verified the following information:  Patient's identification: Yes  Patient location: [place] [address]  Patient's call back number: [phone]  Patient's emergency contact's name and number, as well as permission to contact them if needed:  Extended Emergency Contact Information  Primary Emergency Contact: [address] [facility] Phone: [phone]  Relation: Parent  Secondary Emergency Contact: NATACHA/ Denisa [address] [place], [address] Phone: [phone]  Relation: Parent    Provider location: [place], [address]:  Pt has been fair, with a 3-week stretch that was tough. He started Lexapro a week ago and is feeling better. Noticing more energy. Able to accomplish more. He also did GeneSight testing and is waiting for results. Discussed his fear of fainting or having a heart attack while driving, which remains problematic. O:  Interventions:  -Supportive techniques  -Processed experiences / stressors / concerns  -Discussed psychotropic medications  -Provided psychoeducation re: cognitive restructuring through the use of ABC sheets to challenge maladaptive thoughts      A:  MSE:  Appearance: good hygiene  and appropriate attire  Attitude: cooperative and friendly  Consciousness: alert  Orientation: oriented to person, place, time, general circumstance  Memory: recent and remote memory intact  Attention/Concentration: intact during session  Psychomotor Activity: normal  Eye Contact: normal  Speech: normal rate and volume, well-articulated  Mood: anxious  Affect: congruent  Perception: within normal limits  Thought Content: within normal limits  Thought Process: logical, coherent and goal-directed  Insight: good  Judgment: intact  Ability to understand instructions: Yes  Ability to respond meaningfully: Yes  Morbid Ideation: no   Suicide Assessment: no suicidal ideation, plan, or intent. Appropriate for outpatient / telehealth care at this time. Homicidal Ideation: no      TANI 7 SCORE 6/29/2022   TANI-7 Total Score 21     Interpretation of TANI-7 score: 5-9 = mild anxiety, 10-14 = moderate anxiety, 15+ = severe anxiety. Recommend referral to behavioral health for scores 10 or greater.     PHQ Scores 6/28/2022   PHQ2 Score 2   PHQ9 Score 2     Interpretation of Total Score Depression Severity: 1-4 = Minimal depression, 5-9 = Mild depression, 10-14 = Moderate depression, 15-19 = Moderately severe depression, 20-27 = Severe depression    Diagnosis:    1. Generalized anxiety disorder            Patient Active Problem List   Diagnosis    Gilbert's syndrome    DDD (degenerative disc disease), cervical    Anxiety    Encounter to establish care with new doctor         Plan:  Pt interventions:  Supportive techniques, Provided Psychoeducation re: see above, Emphasized self-care as important for managing overall health, Cognitive strategies to target balanced thinking, and Discussed psychotropic medications. Pt Behavioral Change Plan:  Pt set the following goals:  Practice diaphragmatic breathing throughout the day  Consider journaling in writing or using an river like Day One  Consider learning more about polyvagal theory at www. [email or web address] com/for-curious-people  Consider listening to binaural beats for anxiety and stress management  Complete ABC sheets to challenge negative and/or unrealistic thoughts    Pt scheduled a F/U virtual visit.

## 2022-11-21 NOTE — PATIENT INSTRUCTIONS
Goals:  Practice diaphragmatic breathing throughout the day  Consider journaling in writing or using an river like Day One  Consider learning more about polyvagal theory at www. [email or web address] com/for-curious-people  Consider listening to binaural beats for anxiety and stress management  Complete ABC sheets to challenge negative and/or unrealistic thoughts

## 2022-12-09 ENCOUNTER — TELEPHONE (OUTPATIENT)
Dept: FAMILY MEDICINE CLINIC | Age: 32
End: 2022-12-09

## 2022-12-09 RX ORDER — ESCITALOPRAM OXALATE 10 MG/1
10 TABLET ORAL DAILY
Qty: 30 TABLET | Refills: 5 | Status: SHIPPED | OUTPATIENT
Start: 2022-12-09

## 2022-12-09 NOTE — TELEPHONE ENCOUNTER
----- Message from Neela Bennett sent at 12/9/2022 12:13 PM EST -----  Subject: Refill Request    QUESTIONS  Name of Medication? escitalopram (LEXAPRO) 10 MG tablet  Patient-reported dosage and instructions? 10 once a day  How many days do you have left? 4  Preferred Pharmacy? [id number]  Pharmacy phone number (if available)? [phone]  ---------------------------------------------------------------------------  --------------  Crystal ISAAC  What is the best way for the office to contact you? OK to leave message on   voicemail  Preferred Call Back Phone Number? [phone]  ---------------------------------------------------------------------------  --------------  SCRIPT ANSWERS  Relationship to Patient?  Self

## 2022-12-20 ENCOUNTER — TELEMEDICINE (OUTPATIENT)
Dept: PSYCHOLOGY | Age: 32
End: 2022-12-20
Payer: COMMERCIAL

## 2022-12-20 DIAGNOSIS — F41.1 GENERALIZED ANXIETY DISORDER: Primary | ICD-10-CM

## 2022-12-20 PROCEDURE — 90832 PSYTX W PT 30 MINUTES: CPT | Performed by: PSYCHOLOGIST

## 2022-12-20 NOTE — PROGRESS NOTES
Behavioral Health Consultation  Danny Motta Psy.D. Psychologist  12/20/2022  8:30-9 AM EDT      Time spent with Patient: 30 minutes  This is patient's fifth [facility] appointment. Reason for Consult: Anxiety  Referring Provider: Jeff Grant, JOSE ANTONIO - CNP  [address] [place] [address] [address]    TELEHEALTH VISIT -- Audio/Visual (During [facility] public health emergency)    Prosper Rogers was evaluated through a synchronous (real-time) audio-video encounter using HIPAA-compliant technology. The patient (or guardian, if applicable) is aware that this is a billable service, which includes applicable co-pays. This Virtual Visit was conducted with patient's (and/or legal guardian's) consent. The visit was conducted pursuant to the emergency declaration under the [address] [place] [address], [address] [place] [address] authority and the [employer] and [employer] General Act. Patient identification was verified, and a caregiver was present when appropriate. The patient was located in a state where the provider was licensed to provide care. Conducted a risk-benefit analysis and determined that the patient's presenting problems are consistent with the use of telepsychology. Determined that the patient has sufficient knowledge and skills in the use of technology enabling them to adequately benefit from telepsychology. It was determined that this patient was able to be properly treated without an in-person session. Patient verified current location at the beginning of the visit.     Verified the following information:  Patient's identification: Yes  Patient location: Girlfriend's house @ [address]  Patient's call back number: [phone]  Patient's emergency contact's name and number, as well as permission to contact them if needed:  Extended Emergency Contact Information  Primary Emergency Contact: [address]  Phone: [phone]  Relation: Parent  Secondary Emergency Contact: Shelia Lombard  Home Phone: [phone]  Relation: Parent    Provider location: [place]:  Pt has been feeling pretty well. Since starting the Lexapro, he has noticed a lift in energy, calming of his thoughts. No longer panicking while driving. Better able to run meetings and socialize adequately. Walking regularly but still anxious about strenuous exercise. He discussed ongoing concerns about his health. His GeneSight results returned and showed that Lexapro was on his moderate gene-drug interaction list. Pt is frustrated that GeneSight wasn't suggested initially and that results weren't communicated to him in a timely manner. He wants to wait until after January to consider making changes due to a busy month with work. O:  Interventions:  -Supportive techniques  -Processed experiences / stressors / concerns  -Discussed psychotropic medications and pt's frustrations  -Engaged in cognitive reappraisal related to pt's expectations  -Highlighted areas of progress      A:  MSE:  Appearance: good hygiene  and appropriate attire  Attitude: cooperative and friendly  Consciousness: alert  Orientation: oriented to person, place, time, general circumstance  Memory: recent and remote memory intact  Attention/Concentration: intact during session  Psychomotor Activity: normal  Eye Contact: normal  Speech: normal rate and volume, well-articulated  Mood: mildly anxious  Affect: congruent  Perception: within normal limits  Thought Content: within normal limits  Thought Process: logical, coherent and goal-directed  Insight: good  Judgment: intact  Ability to understand instructions: Yes  Ability to respond meaningfully: Yes  Morbid Ideation: no   Suicide Assessment: no suicidal ideation, plan, or intent. Appropriate for outpatient / telehealth care at this time.   Homicidal Ideation: no      TANI 7 SCORE 6/29/2022   TANI-7 Total Score 21     Interpretation of TANI-7 score: 5-9 = mild anxiety, 10-14 = moderate anxiety, 15+ = severe anxiety. Recommend referral to behavioral health for scores 10 or greater. PHQ Scores 6/28/2022   PHQ2 Score 2   PHQ9 Score 2     Interpretation of Total Score Depression Severity: 1-4 = Minimal depression, 5-9 = Mild depression, 10-14 = Moderate depression, 15-19 = Moderately severe depression, 20-27 = Severe depression    Diagnosis:    1. Generalized anxiety disorder          Patient Active Problem List   Diagnosis    Gilbert's syndrome    DDD (degenerative disc disease), cervical    Anxiety    Encounter to establish care with new doctor         Plan:  Pt interventions:  Supportive techniques, Emphasized self-care as important for managing overall health, Cognitive strategies to target balanced thinking, and Discussed psychotropic medications. Pt Behavioral Change Plan:  Pt set the following goals:  Practice diaphragmatic breathing throughout the day  Consider journaling in writing or using an river like Day One  Consider learning more about polyvagal theory at www. [email or web address] com/for-curious-people  Consider listening to binaural beats for anxiety and stress management  Complete ABC sheets to challenge negative and/or unrealistic thoughts    Pt scheduled a F/U virtual visit.

## 2022-12-20 NOTE — Clinical Note
Hi! KAPIL pt was dissatisfied with your conversation about [employer] results. You'll be seeing him soon to discuss further. I'd like to chat with you about him tomorrow if possible. Thanks!

## 2022-12-22 ENCOUNTER — OFFICE VISIT (OUTPATIENT)
Dept: FAMILY MEDICINE CLINIC | Age: 32
End: 2022-12-22
Payer: COMMERCIAL

## 2022-12-22 VITALS
HEART RATE: 61 BPM | DIASTOLIC BLOOD PRESSURE: 80 MMHG | OXYGEN SATURATION: 99 % | WEIGHT: 130 LBS | TEMPERATURE: 98.3 F | SYSTOLIC BLOOD PRESSURE: 110 MMHG | HEIGHT: 67 IN | BODY MASS INDEX: 20.4 KG/M2

## 2022-12-22 DIAGNOSIS — F41.9 ANXIETY: Primary | ICD-10-CM

## 2022-12-22 PROCEDURE — 99213 OFFICE O/P EST LOW 20 MIN: CPT | Performed by: NURSE PRACTITIONER

## 2022-12-22 RX ORDER — BUSPIRONE HYDROCHLORIDE 10 MG/1
5 TABLET ORAL 3 TIMES DAILY
Qty: 90 TABLET | Refills: 0 | Status: SHIPPED | OUTPATIENT
Start: 2022-12-22

## 2022-12-22 SDOH — ECONOMIC STABILITY: FOOD INSECURITY: WITHIN THE PAST 12 MONTHS, YOU WORRIED THAT YOUR FOOD WOULD RUN OUT BEFORE YOU GOT MONEY TO BUY MORE.: NEVER TRUE

## 2022-12-22 SDOH — ECONOMIC STABILITY: FOOD INSECURITY: WITHIN THE PAST 12 MONTHS, THE FOOD YOU BOUGHT JUST DIDN'T LAST AND YOU DIDN'T HAVE MONEY TO GET MORE.: NEVER TRUE

## 2022-12-22 ASSESSMENT — SOCIAL DETERMINANTS OF HEALTH (SDOH): HOW HARD IS IT FOR YOU TO PAY FOR THE VERY BASICS LIKE FOOD, HOUSING, MEDICAL CARE, AND HEATING?: NOT HARD AT ALL

## 2022-12-22 NOTE — PROGRESS NOTES
Rupali Amor (:  1990) is a 29 y.o. male,Established patient, here for evaluation of the following chief complaint(s):  Follow-up (Genesight results)      ASSESSMENT/PLAN:  1. Anxiety  See subjective  No change inmeds  No follow-ups on file. SUBJECTIVE/OBJECTIVE:  Follow up on genesight testing and anxiety. Has been on Lexapro 10mg daily with buspar 5mg TID. Improving. Feels like its working. Rec cont same dose and follow up with therapy. If change needed call for appt  Current Outpatient Medications   Medication Sig Dispense Refill    busPIRone (BUSPAR) 10 MG tablet Take 0.5 tablets by mouth 3 times daily 90 tablet 0    escitalopram (LEXAPRO) 10 MG tablet Take 1 tablet by mouth daily 30 tablet 5     No current facility-administered medications for this visit. Review of Systems   All other systems reviewed and are negative. Vitals:    22 0900   BP: 110/80   Site: Left Upper Arm   Position: Sitting   Cuff Size: Medium Adult   Pulse: 61   Temp: 98.3 °F (36.8 °C)   SpO2: 99%   Weight: 130 lb (59 kg)   Height: 5' 7\" (1.702 m)       Physical Exam  Constitutional:       Appearance: Normal appearance. He is well-developed and normal weight. HENT:      Head: Normocephalic. Mouth/Throat:      Mouth: Mucous membranes are moist.   Eyes:      Pupils: Pupils are equal, round, and reactive to light. Cardiovascular:      Rate and Rhythm: Normal rate and regular rhythm. Pulmonary:      Effort: Pulmonary effort is normal.   Musculoskeletal:         General: Normal range of motion. Cervical back: Normal range of motion. Skin:     General: Skin is warm and dry. Neurological:      Mental Status: He is alert and oriented to person, place, and time. Psychiatric:         Mood and Affect: Mood normal.         Behavior: Behavior normal.         Thought Content: Thought content normal.               An electronic signature was used to authenticate this note.     --JOSE ANTONIO Jackson - CNP

## 2023-01-06 ENCOUNTER — PATIENT MESSAGE (OUTPATIENT)
Dept: FAMILY MEDICINE CLINIC | Age: 33
End: 2023-01-06

## 2023-01-06 ENCOUNTER — TELEPHONE (OUTPATIENT)
Dept: FAMILY MEDICINE CLINIC | Age: 33
End: 2023-01-06

## 2023-01-06 RX ORDER — ESCITALOPRAM OXALATE 10 MG/1
10 TABLET ORAL DAILY
Qty: 90 TABLET | Refills: 1 | Status: SHIPPED | OUTPATIENT
Start: 2023-01-06 | End: 2023-07-05

## 2023-01-06 NOTE — TELEPHONE ENCOUNTER
From: [facility]  To: Divya Mendoza  Sent: 1/6/2023 9:38 AM EST  Subject: Visit Sharad Thomason,  I hope you enjoyed the holidays! I wanted to follow up on our conversation + had a few questions/ requests. See below:    1. If your kid is interested in reaching out and talking to me about my role, they are more than welcome! 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 My email is [email or web address] and my number is [phone].     2. Can I get a refill of Lexapro? Can we set it up to auto refill every month. .. I don't want to ask too late and go a day or several without it. 3. I got a bill in the mail for the [employer] testing and it cost me $5,600?? What in the absolute world. .. You and I had a conversation about this being common and it usually costing around $300, as well as me having to approve the charge if it was over that. There is no way this is correct, right? I'm very sad about this and it's making me extremely anxious.      Thanks,    [facility]

## 2023-02-03 ENCOUNTER — TELEMEDICINE (OUTPATIENT)
Dept: PSYCHOLOGY | Age: 33
End: 2023-02-03
Payer: COMMERCIAL

## 2023-02-03 DIAGNOSIS — F41.1 GENERALIZED ANXIETY DISORDER: Primary | ICD-10-CM

## 2023-02-03 PROCEDURE — 90832 PSYTX W PT 30 MINUTES: CPT | Performed by: PSYCHOLOGIST

## 2023-02-03 NOTE — PROGRESS NOTES
Behavioral Health Consultation  Chyrl Rubinstein, Psy.D. Psychologist  2/3/2023  8:35-9 AM EDT      Time spent with Patient: 25 minutes (pt joined late after a reminder call)  This is patient's sixth [facility] appointment. Reason for Consult: Anxiety  Referring Provider: Benitez Noyola, JOSE ANTONIO - CNP  [address] [address]    TELEHEALTH VISIT -- Audio/Visual ([facility])    Amanda Mandel was evaluated through a synchronous (real-time) audio-video encounter using HIPAA-compliant technology. The patient (or guardian, if applicable) is aware that this is a billable service, which includes applicable co-pays. This Virtual Visit was conducted with patient's (and/or legal guardian's) consent. The visit was conducted pursuant to the emergency declaration under the [address] [place] [address], [address] [place] [address] authority and the [employer] and [employer] General Act. Patient identification was verified, and a caregiver was present when appropriate. The patient was located in a state where the provider was licensed to provide care. Conducted a risk-benefit analysis and determined that the patient's presenting problems are consistent with the use of telepsychology. Determined that the patient has sufficient knowledge and skills in the use of technology enabling them to adequately benefit from telepsychology. It was determined that this patient was able to be properly treated without an in-person session. Patient verified current location at the beginning of the visit. Verified the following information:  Patient's identification: Yes  Patient location: Hot at [address] [place] [address]  Patient's call back number: [phone]  Patient's emergency contact's name and number, as well as permission to contact them if needed:  Extended Emergency Contact Information  Primary Emergency Contact: [address] [facility] Phone: [phone]  Relation: Parent  Secondary Emergency Contact: Brice Olivera Phone: [phone]  Relation: Parent    Provider location: [place]:  Pt has been pretty good. He's had a very hectic month with work. Believes the medication may be helping. Still taking Lexapro 10mg and BuSpar 15mg TID. He plans to leave these meds as they are for now. Noticing he is feeling way better able to manage anxiety. Still notices stray anxious thoughts but they're not crushing anymore. He is doing better in social situations, although he has had to change his approach. He still notices physiological symptoms that create anxiety. He expressed frustration about having to use strategies to manage anxiety. O:  Interventions:  -Supportive techniques  -Processed experiences / stressors / concerns  -Discussed psychotropic medications   -Engaged in cognitive reappraisal. Discussed balancing the scales between fear of what might happen vs what is actually happening in the present  -Highlighted areas of progress      A:  MSE:  Appearance: good hygiene  and appropriate attire  Attitude: cooperative and friendly  Consciousness: alert  Orientation: oriented to person, place, time, general circumstance  Memory: recent and remote memory intact  Attention/Concentration: intact during session  Psychomotor Activity: normal  Eye Contact: normal  Speech: normal rate and volume, well-articulated  Mood: euthymic  Affect: congruent, brighter  Perception: within normal limits  Thought Content: within normal limits  Thought Process: logical, coherent and goal-directed  Insight: good  Judgment: intact  Ability to understand instructions: Yes  Ability to respond meaningfully: Yes  Morbid Ideation: no   Suicide Assessment: no suicidal ideation, plan, or intent. Appropriate for outpatient / telehealth care at this time.   Homicidal Ideation: no      TANI 7 SCORE 6/29/2022   TANI-7 Total Score 21     Interpretation of TANI-7 score: 5-9 = mild anxiety, 10-14 = moderate anxiety, 15+ = severe anxiety. Recommend referral to behavioral health for scores 10 or greater. PHQ Scores 6/28/2022   PHQ2 Score 2   PHQ9 Score 2     Interpretation of Total Score Depression Severity: 1-4 = Minimal depression, 5-9 = Mild depression, 10-14 = Moderate depression, 15-19 = Moderately severe depression, 20-27 = Severe depression    Diagnosis:    1. Generalized anxiety disorder            Patient Active Problem List   Diagnosis    Gilbert's syndrome    DDD (degenerative disc disease), cervical    Anxiety    Encounter to establish care with new doctor         Plan:  Pt interventions:  Supportive techniques, Emphasized self-care as important for managing overall health, Cognitive strategies to target balanced thinking, and Discussed psychotropic medications. Pt Behavioral Change Plan:  Pt set the following goals:  Practice diaphragmatic breathing throughout the day  Consider journaling in writing or using an river like Day One  Consider learning more about polyvagal theory at www. rhythmofregulation. com/for-curious-people  Consider listening to binaural beats for anxiety and stress management  Complete ABC sheets to challenge negative and/or unrealistic thoughts    Pt scheduled a F/U virtual visit.

## 2023-03-06 RX ORDER — BUSPIRONE HYDROCHLORIDE 10 MG/1
TABLET ORAL
Qty: 90 TABLET | Refills: 0 | Status: SHIPPED | OUTPATIENT
Start: 2023-03-06

## 2023-03-10 ENCOUNTER — TELEMEDICINE (OUTPATIENT)
Dept: PSYCHOLOGY | Age: 33
End: 2023-03-10
Payer: COMMERCIAL

## 2023-03-10 DIAGNOSIS — F41.1 GENERALIZED ANXIETY DISORDER: Primary | ICD-10-CM

## 2023-03-10 PROCEDURE — 90832 PSYTX W PT 30 MINUTES: CPT | Performed by: PSYCHOLOGIST

## 2023-03-10 NOTE — PROGRESS NOTES
Behavioral Health Consultation  Renate Orourke Psy.D. Psychologist  3/10/2023  9-9:30 AM EDT      Time spent with Patient: 30 minutes  This is patient's seventh [facility] appointment. Reason for Consult: Anxiety  Referring Provider: JOSE ANTONIO Soares - CNP  [address] [place] [address] [address]    TELEHEALTH VISIT -- Audio/Visual (During TNCTI-14 public health emergency)    Edgard Oquendo was evaluated through a synchronous (real-time) audio-video encounter using HIPAA-compliant technology. The patient (or guardian, if applicable) is aware that this is a billable service, which includes applicable co-pays. This Virtual Visit was conducted with patient's (and/or legal guardian's) consent. The visit was conducted pursuant to the emergency declaration under the [address] [place] [address], [address] [place] [address] authority and the [employer] and [employer] General Act. Patient identification was verified, and a caregiver was present when appropriate. The patient was located in a state where the provider was licensed to provide care. Conducted a risk-benefit analysis and determined that the patient's presenting problems are consistent with the use of telepsychology. Determined that the patient has sufficient knowledge and skills in the use of technology enabling them to adequately benefit from telepsychology. It was determined that this patient was able to be properly treated without an in-person session. Patient verified current location at the beginning of the visit.     Verified the following information:  Patient's identification: Yes  Patient location: [facility] in [address] [place] [address].   Patient's call back number: [phone]  Patient's emergency contact's name and number, as well as permission to contact them if needed:  Extended Emergency Contact Information  Primary Emergency Contact: [address] Phone: [phone]  Relation: Parent  Secondary Emergency Contact: NATACHA/ Denisa [address], [address] Phone: [phone]  Relation: Parent    Provider location: [facility], [address]:  Pt is feeling better. Noticing improvement in a variety of areas with his anxiety. He has stray anxious thoughts that arise but don't crush his day like they used to. Pt continues to feel relief when he wakes up each morning d/t concern about his ability to breathe while sleeping. Self-judgment is lighter. Pt is hard-pressed to identify reasons to pay attention to his inner experience; he'd rather tune it out. Pt weaned down to 10mg on BuSpar. O:  Interventions:  -Supportive techniques  -Processed experiences / stressors / concerns  -Discussed psychotropic medications   -Engaged in cognitive reappraisal with the ACT bus metaphor  -Highlighted resistance and inner tug-of-war      A:  MSE:  Appearance: good hygiene  and appropriate attire  Attitude: cooperative and friendly  Consciousness: alert  Orientation: oriented to person, place, time, general circumstance  Memory: recent and remote memory intact  Attention/Concentration: intact during session  Psychomotor Activity: normal  Eye Contact: normal  Speech: normal rate and volume, well-articulated  Mood: euthymic  Affect: congruent, bright  Perception: within normal limits  Thought Content: within normal limits  Thought Process: logical, coherent and goal-directed  Insight: good  Judgment: intact  Ability to understand instructions: Yes  Ability to respond meaningfully: Yes  Morbid Ideation: no   Suicide Assessment: no suicidal ideation, plan, or intent. Appropriate for outpatient / telehealth care at this time. Homicidal Ideation: no      TANI 7 SCORE 6/29/2022   TANI-7 Total Score 21     Interpretation of TANI-7 score: 5-9 = mild anxiety, 10-14 = moderate anxiety, 15+ = severe anxiety. Recommend referral to behavioral health for scores 10 or greater.     PHQ Scores 6/28/2022   PHQ2 Score 2   PHQ9 Score 2     Interpretation of Total Score Depression Severity: 1-4 = Minimal depression, 5-9 = Mild depression, 10-14 = Moderate depression, 15-19 = Moderately severe depression, 20-27 = Severe depression    Diagnosis:    1. Generalized anxiety disorder          Patient Active Problem List   Diagnosis    Gilbert's syndrome    DDD (degenerative disc disease), cervical    Anxiety    Encounter to establish care with new doctor         Plan:  Pt interventions:  Supportive techniques, Emphasized self-care as important for managing overall health, Cognitive strategies to target balanced thinking, and Discussed psychotropic medications. Pt Behavioral Change Plan:  Pt set the following goals:  Practice diaphragmatic breathing throughout the day  Consider journaling in writing or using an river like [employer] One  Consider learning more about polyvagal theory at www. rhythmofregulation. com/for-curious-people  Consider listening to binaural beats for anxiety and stress management  Complete ABC sheets to challenge negative and/or unrealistic thoughts    Pt scheduled a F/U virtual visit.

## 2023-05-03 ENCOUNTER — TELEMEDICINE (OUTPATIENT)
Dept: PSYCHOLOGY | Age: 33
End: 2023-05-03
Payer: COMMERCIAL

## 2023-05-03 DIAGNOSIS — F41.1 GENERALIZED ANXIETY DISORDER: Primary | ICD-10-CM

## 2023-05-03 PROCEDURE — 90832 PSYTX W PT 30 MINUTES: CPT | Performed by: PSYCHOLOGIST

## 2023-05-03 NOTE — PROGRESS NOTES
Behavioral Health Consultation  Maya Bernard Psy.D. Psychologist  5/3/2023  9-9:30 AM EDT      Time spent with Patient: 30 minutes  This is patient's eighth [facility] appointment. Reason for Consult: Anxiety  Referring Provider: Bernardo Cerrato, JOSE ANTONIO - CNP  [address] [address]    TELEHEALTH VISIT -- Audio/Visual (During HYOTU-60 public health emergency)    Nikki Harry was evaluated through a synchronous (real-time) audio-video encounter using HIPAA-compliant technology. The patient (or guardian, if applicable) is aware that this is a billable service, which includes applicable co-pays. This Virtual Visit was conducted with patient's (and/or legal guardian's) consent. The visit was conducted pursuant to the emergency declaration under the [address] [place] [address], [address] [place] [address] authority and the [employer] General Act. Patient identification was verified, and a caregiver was present when appropriate. The patient was located in a state where the provider was licensed to provide care. Conducted a risk-benefit analysis and determined that the patient's presenting problems are consistent with the use of telepsychology. Determined that the patient has sufficient knowledge and skills in the use of technology enabling them to adequately benefit from telepsychology. It was determined that this patient was able to be properly treated without an in-person session. Patient verified current location at the beginning of the visit.     Verified the following information:  Patient's identification: Yes  Patient location: [place] [address]  Patient's call back number: [phone]  Patient's emergency contact's name and number, as well as permission to contact them if needed:  Extended Emergency Contact Information  Primary Emergency Contact: [address] Phone: [phone]  Relation: Parent  Secondary

## 2023-06-05 RX ORDER — BUSPIRONE HYDROCHLORIDE 10 MG/1
TABLET ORAL
Qty: 90 TABLET | Refills: 0 | Status: SHIPPED | OUTPATIENT
Start: 2023-06-05

## 2023-06-29 ENCOUNTER — TELEMEDICINE (OUTPATIENT)
Dept: PSYCHOLOGY | Age: 33
End: 2023-06-29
Payer: COMMERCIAL

## 2023-06-29 DIAGNOSIS — F41.1 GENERALIZED ANXIETY DISORDER: Primary | ICD-10-CM

## 2023-06-29 PROCEDURE — 90832 PSYTX W PT 30 MINUTES: CPT | Performed by: PSYCHOLOGIST

## 2023-08-02 NOTE — TELEPHONE ENCOUNTER
Requested Prescriptions     Pending Prescriptions Disp Refills    busPIRone (BUSPAR) 10 MG tablet [Pharmacy Med Name: BUSPIRONE HCL 10 MG TABLET] 90 tablet 0     Sig: TAKE 1/2 TABLET BY MOUTH 3 TIMES DAILY          Last Office Visit: 12/22/22    Next Office Visit: Visit date not found     Last Labs:  6/28/22

## 2023-08-03 RX ORDER — BUSPIRONE HYDROCHLORIDE 10 MG/1
TABLET ORAL
Qty: 90 TABLET | Refills: 0 | Status: SHIPPED | OUTPATIENT
Start: 2023-08-03

## 2023-09-29 RX ORDER — ESCITALOPRAM OXALATE 10 MG/1
10 TABLET ORAL DAILY
Qty: 90 TABLET | Refills: 1 | Status: SHIPPED | OUTPATIENT
Start: 2023-09-29

## 2023-10-02 RX ORDER — BUSPIRONE HYDROCHLORIDE 10 MG/1
TABLET ORAL
Qty: 90 TABLET | Refills: 0 | Status: SHIPPED | OUTPATIENT
Start: 2023-10-02

## 2023-10-02 NOTE — TELEPHONE ENCOUNTER
Requested Prescriptions     Pending Prescriptions Disp Refills    busPIRone (BUSPAR) 10 MG tablet [Pharmacy Med Name: BUSPIRONE HCL 10 MG TABLET] 90 tablet 0     Sig: TAKE 1/2 TABLET BY MOUTH 3 TIMES DAILY          Last Office Visit:12/22/22    Next Office Visit:     Last Labs: 6/28/22

## 2023-11-09 ENCOUNTER — TELEMEDICINE (OUTPATIENT)
Dept: PSYCHOLOGY | Age: 33
End: 2023-11-09

## 2023-11-09 DIAGNOSIS — F41.1 GENERALIZED ANXIETY DISORDER: Primary | ICD-10-CM

## 2023-11-09 PROCEDURE — 90832 PSYTX W PT 30 MINUTES: CPT | Performed by: PSYCHOLOGIST

## 2023-11-29 RX ORDER — BUSPIRONE HYDROCHLORIDE 10 MG/1
TABLET ORAL
Qty: 45 TABLET | Refills: 0 | Status: SHIPPED | OUTPATIENT
Start: 2023-11-29

## 2023-11-29 NOTE — TELEPHONE ENCOUNTER
Seeing Dr. Arcelia Lockwood also- last ov 11-9-23    Requested Prescriptions     Pending Prescriptions Disp Refills    busPIRone (BUSPAR) 10 MG tablet [Pharmacy Med Name: BUSPIRONE HCL 10 MG TABLET] 45 tablet 1     Sig: TAKE 1/2 TABLET BY MOUTH 3 TIMES DAILY

## 2024-01-02 RX ORDER — BUSPIRONE HYDROCHLORIDE 10 MG/1
TABLET ORAL
Qty: 45 TABLET | Refills: 0 | Status: SHIPPED | OUTPATIENT
Start: 2024-01-02

## 2024-02-04 NOTE — PROGRESS NOTES
Behavioral Health Consultation  Toña Hull Psy.D. Psychologist  11/9/2023  9:30-10 AM EDT      Time spent with Patient: 30 minutes  This is patient's tenth [facility] appointment. Reason for Consult: Anxiety  Referring Provider: JOSE ANTONIO Ace CNP  [address]    TELEHEALTH VISIT -- Audio/Visual    Marc Parkinson was evaluated through a synchronous (real-time) audio-video encounter using HIPAA-compliant technology. The patient (or guardian, if applicable) is aware that this is a billable service, which includes applicable co-pays. This Virtual Visit was conducted with patient's (and/or legal guardian's) consent. Patient identification was verified, and a caregiver was present when appropriate. The patient was located in a state where the provider was licensed to provide care. Conducted a risk-benefit analysis and determined that the patient's presenting problems are consistent with the use of telepsychology. Determined that the patient has sufficient knowledge and skills in the use of technology enabling them to adequately benefit from telepsychology. It was determined that this patient was able to be properly treated without an in-person session. Patient verified current location at the beginning of the visit. Verified the following information:  Patient's identification: Yes  Patient location: [address] [place] [address]  Patient's call back number: [phone]  Patient's emergency contact's name and number, as well as permission to contact them if needed:  Extended Emergency Contact Information  Primary Emergency Contact: [address] [place] [address] Phone: [phone]  Relation: Parent  Secondary Emergency Contact: [address], [address] [place] [address] Phone: [phone]  Relation: Parent    Provider location: [facility]:  Pt started the new remote job at [employer]. He's adjusting well. He's playing more intramural sports with a racquet.  He's no longer anxious to the
[FreeTextEntry3] : I, Jenni Leo, am scribing for Dr. Artur Rivas the following sections: HISTORY OF PRESENT ILLNESS, PAST MEDICAL/FAMILY/SOCIAL HISTORY, REVIEW OF SYSTEMS, VITAL SIGNS, PHYSICAL EXAM AND DISPOSITION.   I personally performed the services described in the documentation and reviewed the documented recorded by the scribe in my presence; it accurately and completely records my words and actions.

## 2024-04-24 ENCOUNTER — PATIENT MESSAGE (OUTPATIENT)
Dept: FAMILY MEDICINE CLINIC | Age: 34
End: 2024-04-24

## 2024-04-24 RX ORDER — ESCITALOPRAM OXALATE 10 MG/1
10 TABLET ORAL DAILY
Qty: 30 TABLET | Refills: 26 | OUTPATIENT
Start: 2024-04-24

## 2024-04-29 ENCOUNTER — TELEPHONE (OUTPATIENT)
Dept: FAMILY MEDICINE CLINIC | Age: 34
End: 2024-04-29

## 2024-04-29 RX ORDER — ESCITALOPRAM OXALATE 10 MG/1
10 TABLET ORAL DAILY
Qty: 30 TABLET | Refills: 0 | Status: SHIPPED | OUTPATIENT
Start: 2024-04-29 | End: 2024-04-30 | Stop reason: SDUPTHER

## 2024-04-29 RX ORDER — ESCITALOPRAM OXALATE 10 MG/1
10 TABLET ORAL DAILY
Qty: 30 TABLET | Refills: 26 | OUTPATIENT
Start: 2024-04-29

## 2024-04-29 NOTE — TELEPHONE ENCOUNTER
Requested Prescriptions     Pending Prescriptions Disp Refills    escitalopram (LEXAPRO) 10 MG tablet 90 tablet 1     Sig: Take 1 tablet by mouth daily          Last Office Visit: 12/22/2022     Next Office Visit: 4/30/2024

## 2024-04-29 NOTE — TELEPHONE ENCOUNTER
escitalopram (LEXAPRO) 10 MG tablet [[id number]]    Order Details  Dose: 10 mg Route: Oral Frequency: DAILY   Dispense Quantity: 90 tablet Refills: 1          Sig: TAKE 1 TABLET BY MOUTH EVERY DAY     [facility]/pharm[facility] #[id number] - [place] [id number] - P [phone] - F [phone]       11/9/23 6/28/22

## 2024-04-29 NOTE — TELEPHONE ENCOUNTER
From: SANTA VAZQUEZ  To: Shiv Costello  Sent: 4/24/2024 9:52 AM EDT  Subject: Appointment    Saw Chaney,    In order to fill your prescription, Lakisha will need to see you in office. What days and times work for you?      Glendy Beavers MA

## 2024-04-30 ENCOUNTER — OFFICE VISIT (OUTPATIENT)
Dept: FAMILY MEDICINE CLINIC | Age: 34
End: 2024-04-30
Payer: COMMERCIAL

## 2024-04-30 VITALS
OXYGEN SATURATION: 98 % | BODY MASS INDEX: 24.64 KG/M2 | WEIGHT: 157 LBS | HEIGHT: 67 IN | HEART RATE: 55 BPM | TEMPERATURE: 96.9 F | SYSTOLIC BLOOD PRESSURE: 118 MMHG | DIASTOLIC BLOOD PRESSURE: 86 MMHG

## 2024-04-30 DIAGNOSIS — M50.30 DDD (DEGENERATIVE DISC DISEASE), CERVICAL: ICD-10-CM

## 2024-04-30 DIAGNOSIS — F41.9 ANXIETY: Primary | ICD-10-CM

## 2024-04-30 DIAGNOSIS — E80.4 GILBERT'S SYNDROME: ICD-10-CM

## 2024-04-30 PROBLEM — Z76.89 ENCOUNTER TO ESTABLISH CARE WITH NEW DOCTOR: Status: RESOLVED | Noted: 2022-06-28 | Resolved: 2024-04-30

## 2024-04-30 PROCEDURE — 99214 OFFICE O/P EST MOD 30 MIN: CPT | Performed by: NURSE PRACTITIONER

## 2024-04-30 PROCEDURE — G8420 CALC BMI NORM PARAMETERS: HCPCS | Performed by: NURSE PRACTITIONER

## 2024-04-30 PROCEDURE — G8427 DOCREV CUR MEDS BY ELIG CLIN: HCPCS | Performed by: NURSE PRACTITIONER

## 2024-04-30 PROCEDURE — G2211 COMPLEX E/M VISIT ADD ON: HCPCS | Performed by: NURSE PRACTITIONER

## 2024-04-30 PROCEDURE — 4004F PT TOBACCO SCREEN RCVD TLK: CPT | Performed by: NURSE PRACTITIONER

## 2024-04-30 RX ORDER — BUSPIRONE HYDROCHLORIDE 10 MG/1
TABLET ORAL
Qty: 45 TABLET | Refills: 0 | Status: SHIPPED | OUTPATIENT
Start: 2024-04-30

## 2024-04-30 RX ORDER — ESCITALOPRAM OXALATE 10 MG/1
10 TABLET ORAL DAILY
Qty: 90 TABLET | Refills: 4 | Status: SHIPPED | OUTPATIENT
Start: 2024-04-30

## 2024-04-30 SDOH — ECONOMIC STABILITY: FOOD INSECURITY: WITHIN THE PAST 12 MONTHS, YOU WORRIED THAT YOUR FOOD WOULD RUN OUT BEFORE YOU GOT MONEY TO BUY MORE.: NEVER TRUE

## 2024-04-30 SDOH — ECONOMIC STABILITY: HOUSING INSECURITY
IN THE LAST 12 MONTHS, WAS THERE A TIME WHEN YOU DID NOT HAVE A STEADY PLACE TO SLEEP OR SLEPT IN A SHELTER (INCLUDING NOW)?: NO

## 2024-04-30 SDOH — ECONOMIC STABILITY: FOOD INSECURITY: WITHIN THE PAST 12 MONTHS, THE FOOD YOU BOUGHT JUST DIDN'T LAST AND YOU DIDN'T HAVE MONEY TO GET MORE.: NEVER TRUE

## 2024-04-30 SDOH — ECONOMIC STABILITY: INCOME INSECURITY: HOW HARD IS IT FOR YOU TO PAY FOR THE VERY BASICS LIKE FOOD, HOUSING, MEDICAL CARE, AND HEATING?: NOT HARD AT ALL

## 2024-04-30 ASSESSMENT — PATIENT HEALTH QUESTIONNAIRE - PHQ9
1. LITTLE INTEREST OR PLEASURE IN DOING THINGS: NOT AT ALL
SUM OF ALL RESPONSES TO PHQ QUESTIONS 1-9: 0
SUM OF ALL RESPONSES TO PHQ QUESTIONS 1-9: 0
SUM OF ALL RESPONSES TO PHQ9 QUESTIONS 1 & 2: 0
SUM OF ALL RESPONSES TO PHQ QUESTIONS 1-9: 0
SUM OF ALL RESPONSES TO PHQ QUESTIONS 1-9: 0
2. FEELING DOWN, DEPRESSED OR HOPELESS: NOT AT ALL

## 2024-04-30 NOTE — PROGRESS NOTES
Shiv Costello (:  1990) is a 33 y.o. male,Established patient, here for evaluation of the following chief complaint(s):  Medication Check      ASSESSMENT/PLAN:  1. Anxiety  Assessment & Plan:  Very well-controlled continue medication.  Follow-up as needed.  2. DDD (degenerative disc disease), cervical  Assessment & Plan:  Stable well-controlled  3. Gilbert's syndrome  Assessment & Plan:  Asymptomatic, stable      No follow-ups on file.    SUBJECTIVE/OBJECTIVE:  Shiv presents today for follow-up on anxiety and depression.  Reports he is very well-controlled on Lexapro 10 mg a day in addition to 5 mg BuSpar just once a day.  He is no longer seeing therapy but he does not want to come off his medication.  Current Outpatient Medications   Medication Sig Dispense Refill    escitalopram (LEXAPRO) 10 MG tablet Take 1 tablet by mouth daily 90 tablet 4    busPIRone (BUSPAR) 10 MG tablet TAKE 1/2 TABLET BY MOUTH 3 TIMES DAILY 45 tablet 0     No current facility-administered medications for this visit.       Review of Systems   All other systems reviewed and are negative.      Vitals:    24 0915   BP: 118/86   Pulse: 55   Temp: 96.9 °F (36.1 °C)   SpO2: 98%   Weight: 71.2 kg (157 lb)   Height: 1.702 m (5' 7\")       Physical Exam  Constitutional:       Appearance: Normal appearance. He is well-developed and normal weight.   HENT:      Head: Normocephalic.      Mouth/Throat:      Mouth: Mucous membranes are moist.   Eyes:      Pupils: Pupils are equal, round, and reactive to light.   Cardiovascular:      Rate and Rhythm: Normal rate and regular rhythm.   Musculoskeletal:         General: Normal range of motion.      Cervical back: Normal range of motion.   Skin:     General: Skin is warm and dry.   Neurological:      Mental Status: He is alert and oriented to person, place, and time.                 An electronic signature was used to authenticate this note.    --HUMBLE TORRES, JOSE ANTONIO - CNP

## 2024-05-22 RX ORDER — ESCITALOPRAM OXALATE 10 MG/1
10 TABLET ORAL DAILY
Qty: 90 TABLET | Refills: 0 | Status: SHIPPED | OUTPATIENT
Start: 2024-05-22 | End: 2024-08-20

## 2024-05-22 NOTE — TELEPHONE ENCOUNTER
Requested Prescriptions     Pending Prescriptions Disp Refills    escitalopram (LEXAPRO) 10 MG tablet [Pharmacy Med Name: ESCITALOPRAM 10 MG TABLET] 30 tablet      Sig: TAKE 1 TABLET BY MOUTH EVERY DAY          Last Office Visit: 4/30/2024     Next Office Visit: Visit date not found

## 2025-03-28 RX ORDER — BUSPIRONE HYDROCHLORIDE 10 MG/1
TABLET ORAL
Qty: 45 TABLET | Refills: 0 | Status: SHIPPED | OUTPATIENT
Start: 2025-03-28

## 2025-03-28 NOTE — TELEPHONE ENCOUNTER
Last ov 4-20-24  No future appt    Requested Prescriptions     Pending Prescriptions Disp Refills    busPIRone (BUSPAR) 10 MG tablet [Pharmacy Med Name: BUSPIRONE HCL 10 MG TABLET] 45 tablet 0     Sig: TAKE 1/2 TABLET BY MOUTH 3 TIMES DAILY

## 2025-04-21 RX ORDER — BUSPIRONE HYDROCHLORIDE 10 MG/1
TABLET ORAL
Qty: 135 TABLET | Refills: 1 | OUTPATIENT
Start: 2025-04-21

## 2025-04-21 NOTE — TELEPHONE ENCOUNTER
Left vm informing pt Lakisha is unable to refill the Buspar as he is due for an OV. Requested pt call back to sched an appt.

## 2025-05-05 ASSESSMENT — PATIENT HEALTH QUESTIONNAIRE - PHQ9
SUM OF ALL RESPONSES TO PHQ QUESTIONS 1-9: 0
1. LITTLE INTEREST OR PLEASURE IN DOING THINGS: NOT AT ALL
SUM OF ALL RESPONSES TO PHQ QUESTIONS 1-9: 0
SUM OF ALL RESPONSES TO PHQ QUESTIONS 1-9: 0
SUM OF ALL RESPONSES TO PHQ9 QUESTIONS 1 & 2: 0
SUM OF ALL RESPONSES TO PHQ QUESTIONS 1-9: 0
1. LITTLE INTEREST OR PLEASURE IN DOING THINGS: NOT AT ALL
2. FEELING DOWN, DEPRESSED OR HOPELESS: NOT AT ALL
2. FEELING DOWN, DEPRESSED OR HOPELESS: NOT AT ALL

## 2025-05-06 ENCOUNTER — OFFICE VISIT (OUTPATIENT)
Dept: FAMILY MEDICINE CLINIC | Age: 35
End: 2025-05-06
Payer: COMMERCIAL

## 2025-05-06 VITALS
HEART RATE: 65 BPM | OXYGEN SATURATION: 98 % | DIASTOLIC BLOOD PRESSURE: 66 MMHG | SYSTOLIC BLOOD PRESSURE: 120 MMHG | BODY MASS INDEX: 26.09 KG/M2 | HEIGHT: 67 IN | WEIGHT: 166.2 LBS | TEMPERATURE: 97.8 F

## 2025-05-06 DIAGNOSIS — E80.4 GILBERT'S SYNDROME: ICD-10-CM

## 2025-05-06 DIAGNOSIS — F41.9 ANXIETY: ICD-10-CM

## 2025-05-06 DIAGNOSIS — M50.30 DDD (DEGENERATIVE DISC DISEASE), CERVICAL: ICD-10-CM

## 2025-05-06 DIAGNOSIS — Z00.00 ENCOUNTER FOR WELL ADULT EXAM WITHOUT ABNORMAL FINDINGS: Primary | ICD-10-CM

## 2025-05-06 PROCEDURE — 99395 PREV VISIT EST AGE 18-39: CPT | Performed by: NURSE PRACTITIONER

## 2025-05-06 PROCEDURE — G8427 DOCREV CUR MEDS BY ELIG CLIN: HCPCS | Performed by: NURSE PRACTITIONER

## 2025-05-06 PROCEDURE — 4004F PT TOBACCO SCREEN RCVD TLK: CPT | Performed by: NURSE PRACTITIONER

## 2025-05-06 PROCEDURE — 99214 OFFICE O/P EST MOD 30 MIN: CPT | Performed by: NURSE PRACTITIONER

## 2025-05-06 PROCEDURE — G8419 CALC BMI OUT NRM PARAM NOF/U: HCPCS | Performed by: NURSE PRACTITIONER

## 2025-05-06 RX ORDER — BUSPIRONE HYDROCHLORIDE 5 MG/1
5 TABLET ORAL DAILY
Qty: 90 TABLET | Refills: 2 | Status: SHIPPED | OUTPATIENT
Start: 2025-05-06 | End: 2026-01-31

## 2025-05-06 RX ORDER — ESCITALOPRAM OXALATE 10 MG/1
10 TABLET ORAL DAILY
Qty: 90 TABLET | Refills: 3 | Status: SHIPPED | OUTPATIENT
Start: 2025-05-06 | End: 2026-05-01

## 2025-05-06 SDOH — ECONOMIC STABILITY: FOOD INSECURITY: WITHIN THE PAST 12 MONTHS, YOU WORRIED THAT YOUR FOOD WOULD RUN OUT BEFORE YOU GOT MONEY TO BUY MORE.: NEVER TRUE

## 2025-05-06 SDOH — ECONOMIC STABILITY: FOOD INSECURITY: WITHIN THE PAST 12 MONTHS, THE FOOD YOU BOUGHT JUST DIDN'T LAST AND YOU DIDN'T HAVE MONEY TO GET MORE.: NEVER TRUE

## 2025-05-06 NOTE — PROGRESS NOTES
Well Adult Note  Name: Shiv Cosetllo Today’s Date: 2025   MRN: [id number] Sex: Male   Age: 34 y.o. Ethnicity: Non- / Non    : 1990 Race: White (non-)      Shiv Costello is here for a well adult exam.          Assessment & Plan  1. Anxiety.  - He is currently taking BuSpar 5 mg daily and Lexapro 10 mg daily. He reports that the medications are working well for him, although he experiences significant sexual side effects from Lexapro.  - He was advised to continue BuSpar as needed and to consider weaning off Lexapro if the sexual side effects become too bothersome. The process for weaning off Lexapro was discussed: reducing the dose by half for a couple of weeks, then taking it every other day for a week before stopping completely.  - He was informed about the potential withdrawal symptoms if stopped abruptly.  - A prescription for BuSpar 5 mg tablets was provided.    2. Degenerative disc disease.  - He has mild C4-C5 degenerative disc disease with some facet sclerosis and disk space narrowing.  - He was advised to be cautious with activities that may strain his neck and to avoid high-risk activities like riding roller coasters.  - Review of previous neck x-rays from  confirmed mild degenerative changes.  - No new imaging or specific treatment was recommended at this time.    3. Gilbert's syndrome.  - No specific treatment is required for Gilbert's syndrome at this time.  - Routine monitoring of liver function was recommended.  - Comprehensive blood work panel will be conducted to assess liver function.  - No new symptoms or concerns related to Gilbert's syndrome were reported.    4. Health maintenance.  - He was advised to maintain regular dental check-ups and to ensure his teeth are cleaned regularly.  - A comprehensive blood work panel will be conducted to assess liver and kidney function, blood counts, and blood sugar levels.  - No immediate concerns or symptoms were reported that

## 2025-05-06 NOTE — PATIENT INSTRUCTIONS
Patient declines [facility] resources.          Well Visit, Ages 18 to 65: Care Instructions  Well visits can help you stay healthy. Your doctor has checked your overall health and may have suggested ways to take good care of yourself. Your doctor also may have recommended tests. You can help prevent illness with healthy eating, good sleep, vaccinations, regular exercise, and other steps.    Get the tests that you and your doctor decide on. Depending on your age and risks, examples might include screening for diabetes; hepatitis C; HIV; and cervical, breast, lung, and colon cancer. Screening helps find diseases before any symptoms appear.   Eat healthy foods. Choose fruits, vegetables, whole grains, lean protein, and low-fat dairy foods. Limit saturated fat and reduce salt.     Limit alcohol. Men should have no more than 2 drinks a day. Women should have no more than 1. For some people, no alcohol is the best choice.   Exercise. Get at least 30 minutes of exercise on most days of the week. Walking can be a good choice.     Reach and stay at your healthy weight. This will lower your risk for many health problems.   Take care of your mental health. Try to stay connected with friends, family, and community, and find ways to manage stress.     If you're feeling depressed or hopeless, talk to someone. A counselor can help. If you don't have a counselor, talk to your doctor.   Talk to your doctor if you think you may have a problem with alcohol or drug use. This includes prescription medicines, marijuana, and other drugs.     Avoid tobacco and nicotine: Don't smoke, vape, or chew. If you need help quitting, talk to your doctor.   Practice safer sex. Getting tested, using condoms or dental dams, and limiting sex partners can help prevent STIs.     Use birth control if it's important to you to prevent pregnancy. Talk with your doctor about your choices and what might be best for you.   Prevent problems where you can. Protect your

## 2025-06-23 RX ORDER — BUSPIRONE HYDROCHLORIDE 10 MG/1
TABLET ORAL
Qty: 45 TABLET | Refills: 0 | Status: SHIPPED | OUTPATIENT
Start: 2025-06-23

## 2025-07-16 RX ORDER — BUSPIRONE HYDROCHLORIDE 10 MG/1
TABLET ORAL
Qty: 135 TABLET | Refills: 1 | Status: SHIPPED | OUTPATIENT
Start: 2025-07-16